# Patient Record
Sex: MALE | Race: BLACK OR AFRICAN AMERICAN | NOT HISPANIC OR LATINO | Employment: FULL TIME | URBAN - METROPOLITAN AREA
[De-identification: names, ages, dates, MRNs, and addresses within clinical notes are randomized per-mention and may not be internally consistent; named-entity substitution may affect disease eponyms.]

---
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
## 2018-01-06 DIAGNOSIS — M79.671 RIGHT FOOT PAIN: Primary | ICD-10-CM

## 2018-01-06 NOTE — PROGRESS NOTES
FREDERIC. Right foot soreness    17 y.o. year-old [employer]   Pt presents with right foot soreness. Currently undergoing rehab. History of revision Foster fracture at OSH on 9/1/17.  Incision c/d/i. Doing well, has generalized foot soreness, denies feeling a pop, denies specific event.    RECENT HISTORY:   1. No current or recent symptoms      PHYSICAL EXAM:      GEN: Alert and oriented x3. In no apparent distress.     Well-developed, well-nourished male. Observation of ears, eyes, and nose   reveal no gross abnormalities.  See ophthal report for full eye exam    CERVICAL SPINE: Full range of motion with no deficits.     BILATERAL SHOULDER EXAM: shows full symmetric range of motion with 5/5   strength throughout the supraspinatus and infraspinatus, deltoid, and   subscapularis. No evidence of instability.     BILATERAL ELBOW EXAM: Shows full and symmetric extension/flexion, and   pronation/supination, and varus/valgus stability. Negative posterolateral   rotatory instability.     BILATERAL WRIST EXAM: Shows normal and symmetric full flexion/extension   and radial/ulnar deviation.     HAND EXAM: Shows full range of motion to bilateral hands and full   strength and stability to all fingers.     LUMBAR SPINE EXAM: Shows no evidence of any scoliosis. He has full   flexion and extension with no pain and no apparent tenderness to the   spine. Negative straight leg raise bilaterally.     HIP EXAM: Shows full range of motion without pain or signs of impingement.   Symmetric internal rotation without pain.  Has 5/5 hip flexion strength and 5/5 strength testing to the entire lower extremity.     KNEE EXAM: Examination of bilateral knees shows symmetric range of motion   0 to 145 degrees with negative Lachman and stable to varus-valgus stress   testing. No joint line tenderness. Good quad tone. No effusion of either knee.     Bilateral legs: NTTP over tibiae mid shaft or distal.      FOOT AND ANKLE EXAM: Shows good  range of motion to bilateral ankles with   no neurologic deficit.High arched feet. Incision healed. Mild TTP along outside of foot and bottom of foot.      IMAGING:   Previous Ct: no evidence of screw fracture, increase in bone density and likely callus around fracture site, fracture line is less apparent.    A/P: 18 yo  with right foot soreness, increasing discomfort through rehabilitation, history of revision garsia fracture ORIF.  - CT right foot to look for interval garsia fracture healing  - MRI right foot for soft tissue irritation, muscle or bone edema  - continue rehab protocol

## 2018-01-08 ENCOUNTER — TELEPHONE (OUTPATIENT)
Dept: SPORTS MEDICINE | Facility: CLINIC | Age: 18
End: 2018-01-08

## 2018-01-08 ENCOUNTER — HOSPITAL ENCOUNTER (OUTPATIENT)
Dept: RADIOLOGY | Facility: HOSPITAL | Age: 18
Discharge: HOME | End: 2018-01-08
Attending: ORTHOPAEDIC SURGERY
Payer: COMMERCIAL

## 2018-01-08 DIAGNOSIS — M79.671 RIGHT FOOT PAIN: Primary | ICD-10-CM

## 2018-01-08 DIAGNOSIS — M79.671 RIGHT FOOT PAIN: ICD-10-CM

## 2018-01-08 PROCEDURE — 73718 MRI LOWER EXTREMITY W/O DYE: CPT | Mod: 26,RT,, | Performed by: RADIOLOGY

## 2018-01-08 PROCEDURE — 73718 MRI LOWER EXTREMITY W/O DYE: CPT | Mod: TC,RT

## 2018-01-12 ENCOUNTER — TELEPHONE (OUTPATIENT)
Dept: SPORTS MEDICINE | Facility: CLINIC | Age: 18
End: 2018-01-12

## 2018-01-12 ENCOUNTER — HOSPITAL ENCOUNTER (OUTPATIENT)
Dept: RADIOLOGY | Facility: HOSPITAL | Age: 18
Discharge: HOME | End: 2018-01-12
Attending: ORTHOPAEDIC SURGERY
Payer: COMMERCIAL

## 2018-01-12 DIAGNOSIS — M79.671 RIGHT FOOT PAIN: ICD-10-CM

## 2018-01-12 DIAGNOSIS — M79.671 RIGHT FOOT PAIN: Primary | ICD-10-CM

## 2018-01-12 PROCEDURE — 76380 CAT SCAN FOLLOW-UP STUDY: CPT | Mod: TC

## 2018-01-12 PROCEDURE — 76380 CAT SCAN FOLLOW-UP STUDY: CPT | Mod: 26,,, | Performed by: RADIOLOGY

## 2018-01-19 ENCOUNTER — TELEPHONE (OUTPATIENT)
Dept: SPORTS MEDICINE | Facility: CLINIC | Age: 18
End: 2018-01-19

## 2018-01-19 RX ORDER — OSELTAMIVIR PHOSPHATE 75 MG/1
75 CAPSULE ORAL 2 TIMES DAILY
Qty: 10 CAPSULE | Refills: 0 | Status: SHIPPED | OUTPATIENT
Start: 2018-01-19 | End: 2018-01-24

## 2018-01-19 NOTE — TELEPHONE ENCOUNTER
Contacted by (and modality): head athletic trainer (DB) via telephone  Symptoms are: flu-like  I contacted the patient via: care coordinator (Alexis)  Symptoms are concerning for influenza  Assessment: suspicion of influenza  With: n/a  Plan:   Patient will go to urgent care, and get swabbed for influenza  In the meantime, with high community incidence of influenza, I will start him on oseltamivir (Tamiflu) 75mg po bid x 5 d  Discussed contacting me (or the medical team) immediately with any questions, or if symptoms change    I will f/u w/ pt directly at tomorrows game

## 2018-01-20 ENCOUNTER — OFFICE VISIT (OUTPATIENT)
Dept: INTERNAL MEDICINE | Facility: CLINIC | Age: 18
End: 2018-01-20
Payer: COMMERCIAL

## 2018-01-20 VITALS
SYSTOLIC BLOOD PRESSURE: 126 MMHG | DIASTOLIC BLOOD PRESSURE: 78 MMHG | HEART RATE: 64 BPM | OXYGEN SATURATION: 98 % | TEMPERATURE: 98 F | WEIGHT: 200 LBS | HEIGHT: 76 IN | BODY MASS INDEX: 24.36 KG/M2

## 2018-01-20 DIAGNOSIS — J40 BRONCHITIS: Primary | ICD-10-CM

## 2018-01-20 DIAGNOSIS — R68.89 FLU-LIKE SYMPTOMS: ICD-10-CM

## 2018-01-20 PROBLEM — S99.191D FRACTURE OF BASE OF FIFTH METATARSAL BONE OF RIGHT FOOT AT METAPHYSEAL-DIAPHYSEAL JUNCTION WITH ROUTINE HEALING: Status: ACTIVE | Noted: 2018-01-20

## 2018-01-20 LAB
CTP QC/QA: YES
FLUAV AG NPH QL: NEGATIVE
FLUBV AG NPH QL: NEGATIVE

## 2018-01-20 PROCEDURE — 99999 PR PBB SHADOW E&M-EST. PATIENT-LVL III: CPT | Mod: PBBFAC,,, | Performed by: INTERNAL MEDICINE

## 2018-01-20 PROCEDURE — 87804 INFLUENZA ASSAY W/OPTIC: CPT | Mod: QW,S$GLB,, | Performed by: INTERNAL MEDICINE

## 2018-01-20 PROCEDURE — 99214 OFFICE O/P EST MOD 30 MIN: CPT | Mod: S$GLB,,, | Performed by: INTERNAL MEDICINE

## 2018-01-20 RX ORDER — DOXYCYCLINE 100 MG/1
100 CAPSULE ORAL 2 TIMES DAILY
Qty: 14 CAPSULE | Refills: 0 | Status: SHIPPED | OUTPATIENT
Start: 2018-01-20 | End: 2019-10-03

## 2018-01-20 NOTE — PROGRESS NOTES
Subjective:       Patient ID: David Harmon is a 17 y.o. male.    Chief Complaint: Cough    [facility] appt    A few nights ago developed cough, vomiting, sore throat, myalgias.  [employer], lots of travel and lots of contacts but nobody specifically with flu.      Was started on Tamiflu yesterday x 2 and 1 dose today.    Did get flu shot last fall.    No real chest pain or tightness.  Non smoker.  No asthma.  No further GI or any  sx.          Cough   Pertinent negatives include no chills, ear pain, fever, postnasal drip, rash, shortness of breath or wheezing.     Review of Systems   Constitutional: Negative for chills, fatigue and fever.   HENT: Positive for congestion. Negative for ear pain, postnasal drip, sinus pain and sinus pressure.    Eyes: Negative.    Respiratory: Positive for cough. Negative for chest tightness, shortness of breath and wheezing.    Cardiovascular: Negative.    Gastrointestinal: Negative.         Vomited x 1, has resolved  No nausea or abdominal pain   Genitourinary: Negative for dysuria and frequency.   Musculoskeletal: Negative for arthralgias.   Skin: Negative for rash.       Objective:      Physical Exam   Constitutional: He is oriented to person, place, and time. He appears well-developed and well-nourished.   HENT:   Head: Normocephalic and atraumatic.   Right Ear: External ear normal.   Left Ear: External ear normal.   Mouth/Throat: Oropharynx is clear and moist.   TMs dull, no effusion   Neck: Normal range of motion. Neck supple. No thyromegaly present.   Cardiovascular: Normal rate and regular rhythm.    Pulmonary/Chest: No respiratory distress. He has no wheezes. He has no rales.   Abdominal: Soft. Bowel sounds are normal. He exhibits no distension. There is no tenderness.   Musculoskeletal: He exhibits no edema.   Neurological: He is alert and oriented to person, place, and time.   Skin: Skin is warm and dry. No rash noted. No erythema.   Psychiatric: He has a normal mood and  affect.       Assessment:       1. Bronchitis    2. Flu-like symptoms        Plan:         Bronchitis      Flu-like sx    Other orders  -     doxycycline (MONODOX) 100 MG capsule; Take 1 capsule (100 mg total) by mouth 2 (two) times daily.  Dispense: 14 capsule; Refill: 0.  Cautions and side effects reviewed    Rest, fluids, acetaminophen, mucinex and follow up poor results.  OK to continue with Tamiflu empirically, even though flu culture negative

## 2018-01-20 NOTE — PATIENT INSTRUCTIONS
What Is Acute Bronchitis?  Acute bronchitis is when the airways in your lungs (bronchial tubes) become red and swollen (inflamed). It is usually caused by a viral infection. But it can also occur because of a bacteria or allergen. Symptoms include a cough that produces yellow or greenish mucus and can last for days or sometimes weeks.  Inside healthy lungs    Air travels in and out of the lungs through the airways. The linings of these airways produce sticky mucus. This mucus traps particles that enter the lungs. Tiny structures called cilia then sweep the particles out of the airways.     Healthy airway: Airways are normally open. Air moves in and out easily.      Healthy cilia: Tiny, hairlike cilia sweep mucus and particles up and out of the airways.   Lungs with bronchitis  Bronchitis often occurs with a cold or the flu virus. The airways become inflamed (red and swollen). There is a deep hacking cough from the extra mucus. Other symptoms may include:  · Wheezing  · Chest discomfort  · Shortness of breath  · Mild fever  A second infection, this time due to bacteria, may then occur. And airways irritated by allergens or smoke are more likely to get infected.        Inflamed airway: Inflammation and extra mucus narrow the airway, causing shortness of breath.      Impaired cilia: Extra mucus impairs cilia, causing congestion and wheezing. Smoking makes the problem worse.   Making a diagnosis  A physical exam, health history, and certain tests help your healthcare provider make the diagnosis.  Health history  Your healthcare provider will ask you about your symptoms.  The exam  Your provider listens to your chest for signs of congestion. He or she may also check your ears, nose, and throat.  Possible tests  · A sputum test for bacteria. This requires a sample of mucus from your lungs.  · A nasal or throat swab. This tests to see if you have a bacterial infection.  · A chest X-ray. This is done if your healthcare  provider thinks you have pneumonia.  · Tests to check for an underlying condition. Other tests may be done to check for things such as allergies, asthma, or COPD (chronic obstructive pulmonary disease). You may need to see a specialist for more lung function testing.  Treating a cough  The main treatment for bronchitis is easing symptoms. Avoiding smoke, allergens, and other things that trigger coughing can often help. If the infection is bacterial, you may be given antibiotics. During the illness, it's important to get plenty of sleep. To ease symptoms:  · Dont smoke. Also avoid secondhand smoke.  · Use a humidifier. Or try breathing in steam from a hot shower. This may help loosen mucus.  · Drink a lot of water and juice. They can soothe the throat and may help thin mucus.  · Sit up or use extra pillows when in bed. This helps to lessen coughing and congestion.  · Ask your provider about using medicine. Ask about using cough medicine, pain and fever medicine, or a decongestant.  Antibiotics  Most cases of bronchitis are caused by cold or flu viruses. They dont need antibiotics to treat them, even if your mucus is thick and green or yellow. Antibiotics dont treat viral illness and antibiotics have not been shown to have any benefit in cases of acute bronchitis. Taking antibiotics when they are not needed increases your risk of getting an infection later that is antibiotic-resistant. Antibiotics can also cause severe cases of diarrhea that require other antibiotics to treat.  It is important that you accept your healthcare provider's opinion to not use antibiotics. Your provider will prescribe antibiotics if the infection is caused by bacteria. If they are prescribed:  · Take all of the medicine. Take the medicine until it is used up, even if symptoms have improved. If you dont, the bronchitis may come back.  · Take the medicines as directed. For instance, some medicines should be taken with food.  · Ask about  side effects. Ask your provider or pharmacist what side effects are common, and what to do about them.  Follow-up care  You should see your provider again in 2 to 3 weeks. By this time, symptoms should have improved. An infection that lasts longer may mean you have a more serious problem.  Prevention  · Avoid tobacco smoke. If you smoke, quit. Stay away from smoky places. Ask friends and family not to smoke around you, or in your home or car.  · Get checked for allergies.  · Ask your provider about getting a yearly flu shot. Also ask about pneumococcal or pneumonia shots.  · Wash your hands often. This helps reduce the chance of picking up viruses that cause colds and flu.  Call your healthcare provider if:  · Symptoms worsen, or you have new symptoms  · Breathing problems worsen or  become severe  · Symptoms dont get better within a week, or within 3 days of taking antibiotics   Date Last Reviewed: 2/1/2017  © 4761-3574 [employer]. [address] [place] [address], [place] [address]. All rights reserved. This information is not intended as a substitute for professional medical care. Always follow your healthcare professional's instructions.

## 2018-01-22 ENCOUNTER — TELEPHONE (OUTPATIENT)
Dept: INTERNAL MEDICINE | Facility: CLINIC | Age: 18
End: 2018-01-22

## 2018-01-22 NOTE — TELEPHONE ENCOUNTER
----- Message from Candelaria Maldonado MA sent at 1/22/2018 11:45 AM CST -----  Regarding: FW: Merom Player      ----- Message -----  From: Kamla Ku MA  Sent: 1/19/2018   2:43 PM  To: [facility] Staff  Subject: Merom Player                                   Good afternoon,    I work with Dr. Vargas. He is the team physician for the [place] [facility]. [facility] contacted him to let him know that David was sick and was being seen today by your office. Dr. Vargas is trying to f/u on that visit.     Can you please have Dr. Ingram call Dr. Vargas at [phone]?    Thanks,  Kamla Ku MA  [facility]

## 2018-01-22 NOTE — TELEPHONE ENCOUNTER
I claled, no answer    He was booked on my Friday schedule, arrived and checked in, but was not there multiple times when my nurse tried to locate him to bring him back.    I see in the chart Dr. Rick saw him on 1/20/18 and that note is in the chart  angelina

## 2018-01-23 ENCOUNTER — TELEPHONE (OUTPATIENT)
Dept: SPORTS MEDICINE | Facility: CLINIC | Age: 18
End: 2018-01-23

## 2018-01-23 NOTE — TELEPHONE ENCOUNTER
Note from 18.01.19    Contacted by (and modality): head athletic trainer (DB) via telephone  Symptoms are: flu-like  I contacted the patient via: care coordinator (Alexis)  Symptoms are concerning for influenza  Assessment: suspicion of influenza  With: n/a  Plan:   Patient will go to urgent care, and get swabbed for influenza  In the meantime, with high community incidence of influenza, I will start him on oseltamivir (Tamiflu) 75mg po bid x 5 d  Discussed contacting me (or the medical team) immediately with any questions, or if symptoms change    I will f/u w/ pt directly at tomorrows game

## 2018-01-25 ENCOUNTER — TELEPHONE (OUTPATIENT)
Dept: SPORTS MEDICINE | Facility: CLINIC | Age: 18
End: 2018-01-25

## 2018-02-05 DIAGNOSIS — M79.671 RIGHT FOOT PAIN: ICD-10-CM

## 2018-02-05 DIAGNOSIS — M79.672 LEFT FOOT PAIN: Primary | ICD-10-CM

## 2018-02-06 ENCOUNTER — HOSPITAL ENCOUNTER (OUTPATIENT)
Dept: RADIOLOGY | Facility: HOSPITAL | Age: 18
Discharge: HOME | End: 2018-02-06
Attending: ORTHOPAEDIC SURGERY
Payer: COMMERCIAL

## 2018-02-06 DIAGNOSIS — M79.671 RIGHT FOOT PAIN: ICD-10-CM

## 2018-02-06 PROCEDURE — 73718 MRI LOWER EXTREMITY W/O DYE: CPT | Mod: TC,RT

## 2018-02-06 PROCEDURE — 73700 CT LOWER EXTREMITY W/O DYE: CPT | Mod: 26,RT,, | Performed by: RADIOLOGY

## 2018-02-06 PROCEDURE — 73718 MRI LOWER EXTREMITY W/O DYE: CPT | Mod: 26,RT,, | Performed by: RADIOLOGY

## 2018-02-06 PROCEDURE — 73700 CT LOWER EXTREMITY W/O DYE: CPT | Mod: TC,RT

## 2018-02-06 NOTE — PROGRESS NOTES
ROLAC. Right foot soreness     17 y.o. year-old [place]   Pt presents with continued right foot soreness. Currently undergoing rehab. History of revision Foster fracture at OSH on 9/1/17.  Incision c/d/i. Doing well, has generalized foot soreness, denies feeling a pop, denies specific event.     RECENT HISTORY:   1. No current or recent symptoms        PHYSICAL EXAM:         FOOT AND ANKLE EXAM: Shows good range of motion to bilateral ankles with   no neurologic deficit.High arched feet. Incision healed. Mild TTP along outside of foot and bottom of foot.        IMAGING:   Previous Ct: no evidence of screw fracture, increase in bone density and likely callus around fracture site, fracture line is less apparent.     A/P: 16 yo  with right foot soreness, increasing discomfort through rehabilitation, history of revision foster fracture ORIF.  - CT right foot to look for interval foster fracture healing  - MRI right foot for soft tissue irritation, muscle or bone edema  - continue rehab protocol

## 2018-02-07 ENCOUNTER — HOSPITAL ENCOUNTER (OUTPATIENT)
Dept: RADIOLOGY | Facility: HOSPITAL | Age: 18
Discharge: HOME | End: 2018-02-07
Attending: ORTHOPAEDIC SURGERY
Payer: COMMERCIAL

## 2018-02-07 ENCOUNTER — LAB VISIT (OUTPATIENT)
Dept: LAB | Facility: HOSPITAL | Age: 18
End: 2018-02-07
Attending: ORTHOPAEDIC SURGERY
Payer: COMMERCIAL

## 2018-02-07 ENCOUNTER — OFFICE VISIT (OUTPATIENT)
Dept: SPORTS MEDICINE | Facility: CLINIC | Age: 18
End: 2018-02-07
Payer: COMMERCIAL

## 2018-02-07 ENCOUNTER — TELEPHONE (OUTPATIENT)
Dept: SPORTS MEDICINE | Facility: CLINIC | Age: 18
End: 2018-02-07

## 2018-02-07 DIAGNOSIS — M79.671 RIGHT FOOT PAIN: Primary | ICD-10-CM

## 2018-02-07 DIAGNOSIS — M79.671 RIGHT FOOT PAIN: ICD-10-CM

## 2018-02-07 LAB — 25(OH)D3+25(OH)D2 SERPL-MCNC: 24 NG/ML

## 2018-02-07 PROCEDURE — 73630 X-RAY EXAM OF FOOT: CPT | Mod: 26,RT,, | Performed by: RADIOLOGY

## 2018-02-07 PROCEDURE — 99214 OFFICE O/P EST MOD 30 MIN: CPT | Mod: S$GLB,,, | Performed by: ORTHOPAEDIC SURGERY

## 2018-02-07 PROCEDURE — 36415 COLL VENOUS BLD VENIPUNCTURE: CPT | Mod: PO

## 2018-02-07 PROCEDURE — 99999 PR PBB SHADOW E&M-EST. PATIENT-LVL I: CPT | Mod: PBBFAC,,, | Performed by: ORTHOPAEDIC SURGERY

## 2018-02-07 PROCEDURE — 82306 VITAMIN D 25 HYDROXY: CPT

## 2018-02-07 PROCEDURE — 73630 X-RAY EXAM OF FOOT: CPT | Mod: TC,FY,PO,RT

## 2018-02-07 PROCEDURE — 3008F BODY MASS INDEX DOCD: CPT | Mod: S$GLB,,, | Performed by: ORTHOPAEDIC SURGERY

## 2018-02-07 NOTE — TELEPHONE ENCOUNTER
"Spoke c pharmacist from [facility].    Pharmacist stated she spoke c a "nurse" from out office today.      [facility] attempted to refill Forteo today.     Per pharmacist,  is denying refill as this claim has over an $8,000 balance.     Informed pharmacist I am not the best person to answer these questions and will call back after I know more information regarding this case.    ==    No one from [facility] spoke arie Khalil today. Dr. Harrison suggested I call Duane Brooks, ATC.     ==    Left voicemail for Duane Brooks, ATC    ==    Spoke c Kamla Ku ATC.    She stated she does not understand why  would deny refill. She stated she will contact Duane Brooks, ATC.     ==    Spoke c Dr. Vargas.     Informed him of above information.     He will speak c Duane Brooks, ATC at tonight's [employer] game.     ==    Notified Dr. Harrison of plan.     "

## 2018-02-07 NOTE — PROGRESS NOTES
ROLAC. Right foot soreness     17 y.o. year-old [employer]   Pt presents with continued right foot mild lateral soreness. Currently undergoing rehab. History of revision Foster fracture with Dr. Chan on 9/1/17.  Incision c/d/i. Doing well, has generalized foot soreness, denies feeling a pop, denies specific event.    Review of Systems   Constitution: Negative. Negative for chills, fever and night sweats.   HENT: Negative for congestion and headaches.    Eyes: Negative for blurred vision, left vision loss and right vision loss.   Cardiovascular: Negative for chest pain and syncope.   Respiratory: Negative for cough and shortness of breath.    Endocrine: Negative for polydipsia, polyphagia and polyuria.   Hematologic/Lymphatic: Negative for bleeding problem. Does not bruise/bleed easily.   Skin: Negative for dry skin, itching and rash.   Musculoskeletal: Negative for falls and muscle weakness.   Gastrointestinal: Negative for abdominal pain and bowel incontinence.   Genitourinary: Negative for bladder incontinence and nocturia.   Neurological: Negative for disturbances in coordination, loss of balance and seizures.   Psychiatric/Behavioral: Negative for depression. The patient does not have insomnia.    Allergic/Immunologic: Negative for hives and persistent infections.       PAST MEDICAL HISTORY:   Past Medical History:   Diagnosis Date    Fracture of base of fifth metatarsal bone of right foot at metaphyseal-diaphyseal junction with routine healing 1/20/2018     PAST SURGICAL HISTORY: No past surgical history on file.  FAMILY HISTORY: No family history on file.  SOCIAL HISTORY:   Social History     Social History    Marital status: Unknown     Spouse name: N/A    Number of children: N/A    Years of education: N/A     Occupational History    Not on file.     Social History Main Topics    Smoking status: Never Smoker    Smokeless tobacco: Never Used    Alcohol use Not on file    Drug use:  Unknown    Sexual activity: Not on file     Other Topics Concern    Not on file     Social History Narrative    No narrative on file       MEDICATIONS:   Current Outpatient Prescriptions:     doxycycline (MONODOX) 100 MG capsule, Take 1 capsule (100 mg total) by mouth 2 (two) times daily., Disp: 14 capsule, Rfl: 0    hydrocodone-acetaminophen 7.5-325mg (NORCO) 7.5-325 mg per tablet, Take 1-2 tabs every 4-6 hours prn pain, Disp: 40 tablet, Rfl: 0  ALLERGIES: Review of patient's allergies indicates:  No Known Allergies    VITAL SIGNS: There were no vitals taken for this visit.         PHYSICAL EXAM:            PHYSICAL EXAMINATION    General:  The patient is alert and oriented x 3.  Mood is pleasant.  Observation of ears, eyes and nose reveal no gross abnormalities.  No labored breathing observed.    right Foot and Ankle Exam    INSPECTION:      ALIGNMENT:  Gait:    Normal   Hindfoot  Normal    Scars:   None    Midfoot: Normal  Swelling:   mild and moderate    Forefoot: Normal  Color:   Normal      Atrophy:  None    Collective Ankle-Hindfoot Alignment    Heel / Toe Walking: No difficulty   Good -plantigrade (PG), well aligned           [Fair-PG, malaligned, asymptomatic]         [Poor-Non-PG,malaligned, has sxs]     TENDERNESS:  lATERAL:    anterior:  Sinus tarsi:  None  Anteromedial joint line:  none  Syndesmosis:  none  Anterolateral joint line:   none  ATFL:   none  Talonavicular:    none   CFL:   none  Anterior tibialis:   none  Anterolateral gutter: none  Extensor tendons:   none  Fibula:   none  Peroneal tendons: none  POSTERIOR:  Peroneal tubercle.  None  Medial/lateral achilles:   none       Medial/lateral achilles insertion: none  MEDIAL:      Deltoid:  none  CALCANEUS:  Malleolus:  none  Retrocalcaneal:   none  PTT:   none  Medial achilles:   none  Navicular:  none  Lateral achilles:   none       Calcaneal tuberosity:   none  FOOT:    Calcaneal cuboid  none MT / MT heads:  none   Navicular   none   Medial cord origin PF:  none  Cuneiforms:   none  Web space:   none  Lisfranc    none  Tarsal tunnel:   none  Base of the fifth metatarsal  none Tinels sign   neg        RANGE OF MOTION:  RIGHT/ LEFT   STRENGTH: (affected)  Ankle DF/PF:  15/45  15/45    Anterior tibialis: 5/5     Eversion/Inversion: 15/25 15/25  Posterior tibialis: 5/5   Midfoot ABD/ADD: 10/10 10/10  Gastroc-soleus: 5/5   First MTP DF/PF: 60/25 60/25  Peroneals:  5/5         EHL:   5/5   (* = pain)     FHL:   5/5         (* = pain)      SPECIAL TESTS:   ANKLE INSTABILITY: (*pain)    Anterior drawer:   Grade 1      (C-W contralateral side)     Inversion:   30°     Eversion  10°            Collective Instability: (Ant-post and varus-valgus)     Stable        PROVOCATIVE TESTING:    Forced DF/ER: No pain at syndesmosis.    Mid-leg squeeze  No pain at syndesmosis    Forced DF:  No pain anterior joint line.      Forced PF:  No pain posterior ankle.     Forced INV:  No pain lateral    Forced EV:  No pain medial     Muñozs sign: Normal ankle plantar flexion.     Resisted peroneal No subluxation or pain    1st-2nd MT toggle No pain at Lisfranc    MT-T torque  No pain at Lisfranc     NEUROLOGIC TESTING:  All dermatomes foot, ankle and leg have normal sensation light touch  Ankle Reflexes 2+, symmetric   Negative Babinski and No Clonus    VASCULAR:  2+ pulses PT/DT with brisk capillary refill toes.    Other Findings:  Right  mild swelling is present            Mild TTP along outside of foot and bottom of foot.        IMAGING:   XRays: The patient is status post ORIF for fracture in the proximal diaphysis of the LEFT fifth metatarsal.  Hardware is in satisfactory position and alignment.  Fracture line observed on 9/18/2017 has resolved.  Callus has matured at the site of prior injury.      CT:  Interval healing seen at base of 5th metatarsal, no evidence of fracture    MRI: Status post 5th metatarsal screw revision.  Despite metal suppression  techniques, the bone and soft tissues surrounding the screw a markedly distorted by artifact.  No definite evidence for complication.  Subchondral edema at the anterior cuboid mildly diminished from prior study.     A/P: 18 yo  with right foot soreness, increasing discomfort through rehabilitation, history of revision garsia fracture ORIF.  - continue rehab protocol  - discuss with Dr Chan Friday  -CT and MRI reviewed with Dr Chan  - Vitamin D labs

## 2018-02-09 ENCOUNTER — TELEPHONE (OUTPATIENT)
Dept: SPORTS MEDICINE | Facility: CLINIC | Age: 18
End: 2018-02-09

## 2018-02-09 NOTE — TELEPHONE ENCOUNTER
Contacted by (and modality): head team physician (MS) via text    Pt w/ right 5th MT fracture (and subsequent ORIF)  Found on 02/07/18 to have [blood vitamin D] = 24 ng/mL    Assessment: low serum vitamin D  With: n/a    Plan:   1) Supplement with:  Vitamin D3 (cholecalciferol)   1000 international units (IU), by mouth, daily    2) Repeat [blood vitamin D] in 8 weeks  (around 04/10/08)     Plan was discussed with the patient and with the medical team (MS and DB) via text

## 2018-02-12 RX ORDER — TRAMADOL HYDROCHLORIDE 50 MG/1
50 TABLET ORAL EVERY 12 HOURS PRN
Qty: 30 TABLET | Refills: 0 | Status: SHIPPED | OUTPATIENT
Start: 2018-02-12 | End: 2018-02-22

## 2018-02-23 ENCOUNTER — TELEPHONE (OUTPATIENT)
Dept: SPORTS MEDICINE | Facility: CLINIC | Age: 18
End: 2018-02-23

## 2018-02-23 DIAGNOSIS — M79.671 RIGHT FOOT PAIN: Primary | ICD-10-CM

## 2018-02-23 DIAGNOSIS — M89.9 DISORDER OF BONE: ICD-10-CM

## 2018-02-26 ENCOUNTER — HOSPITAL ENCOUNTER (OUTPATIENT)
Dept: RADIOLOGY | Facility: HOSPITAL | Age: 18
Discharge: HOME | End: 2018-02-26
Attending: PHYSICIAN ASSISTANT
Payer: COMMERCIAL

## 2018-02-26 DIAGNOSIS — M79.671 RIGHT FOOT PAIN: ICD-10-CM

## 2018-02-26 DIAGNOSIS — M89.9 DISORDER OF BONE: ICD-10-CM

## 2018-02-26 DIAGNOSIS — M89.9 BONE DISEASE: ICD-10-CM

## 2018-02-26 DIAGNOSIS — M89.9 BONE DISEASE: Primary | ICD-10-CM

## 2018-02-26 PROCEDURE — A9503 TC99M MEDRONATE: HCPCS

## 2018-02-26 PROCEDURE — 78315 BONE IMAGING 3 PHASE: CPT | Mod: 26,,, | Performed by: RADIOLOGY

## 2018-02-26 PROCEDURE — 78320 NM BONE SCAN SPECT: CPT | Mod: TC

## 2018-02-26 PROCEDURE — 78315 BONE IMAGING 3 PHASE: CPT | Mod: TC

## 2018-02-26 PROCEDURE — 78320 NM BONE SCAN SPECT: CPT | Mod: 26,,, | Performed by: RADIOLOGY

## 2018-03-16 RX ORDER — OXYCODONE AND ACETAMINOPHEN 10; 325 MG/1; MG/1
1 TABLET ORAL EVERY 6 HOURS PRN
Qty: 30 TABLET | Refills: 0 | Status: SHIPPED | OUTPATIENT
Start: 2018-03-16 | End: 2019-10-03

## 2018-06-18 ENCOUNTER — TELEPHONE (OUTPATIENT)
Dept: SPORTS MEDICINE | Facility: CLINIC | Age: 18
End: 2018-06-18

## 2018-06-18 DIAGNOSIS — M79.671 RIGHT FOOT PAIN: Primary | ICD-10-CM

## 2018-06-20 ENCOUNTER — HOSPITAL ENCOUNTER (OUTPATIENT)
Dept: RADIOLOGY | Facility: HOSPITAL | Age: 18
Discharge: HOME | End: 2018-06-20
Attending: STUDENT IN AN ORGANIZED HEALTH CARE EDUCATION/TRAINING PROGRAM
Payer: COMMERCIAL

## 2018-06-20 DIAGNOSIS — M79.671 RIGHT FOOT PAIN: ICD-10-CM

## 2018-06-20 PROCEDURE — 73718 MRI LOWER EXTREMITY W/O DYE: CPT | Mod: 26,RT,, | Performed by: RADIOLOGY

## 2018-06-20 PROCEDURE — 73718 MRI LOWER EXTREMITY W/O DYE: CPT | Mod: TC,RT

## 2018-06-20 PROCEDURE — 73700 CT LOWER EXTREMITY W/O DYE: CPT | Mod: TC,RT

## 2018-06-20 PROCEDURE — 73700 CT LOWER EXTREMITY W/O DYE: CPT | Mod: 26,RT,, | Performed by: RADIOLOGY

## 2018-07-02 ENCOUNTER — OFFICE VISIT (OUTPATIENT)
Dept: SPORTS MEDICINE | Facility: CLINIC | Age: 18
End: 2018-07-02

## 2018-07-02 DIAGNOSIS — Z00.00 WELLNESS EXAMINATION: Primary | ICD-10-CM

## 2018-07-02 PROCEDURE — 99214 OFFICE O/P EST MOD 30 MIN: CPT | Mod: ,,, | Performed by: ORTHOPAEDIC SURGERY

## 2018-07-02 PROCEDURE — 99999 PR PBB SHADOW E&M-EST. PATIENT-LVL I: CPT | Mod: PBBFAC,,, | Performed by: ORTHOPAEDIC SURGERY

## 2018-07-02 NOTE — PROGRESS NOTES
C.C. Right foot pain     17 y.o. year-old [employer]  s/p revision Foster fracture with Dr. Chan on 9/1/17.  then revision of scar and BMAC injection to cuboid with Dr. Chan 3/13/18    Doing well, no issues.  Full go.          Review of Systems   Constitution: Negative. Negative for chills, fever and night sweats.   HENT: Negative for congestion and headaches.    Eyes: Negative for blurred vision, left vision loss and right vision loss.   Cardiovascular: Negative for chest pain and syncope.   Respiratory: Negative for cough and shortness of breath.    Endocrine: Negative for polydipsia, polyphagia and polyuria.   Hematologic/Lymphatic: Negative for bleeding problem. Does not bruise/bleed easily.   Skin: Negative for dry skin, itching and rash.   Musculoskeletal: Negative for falls and muscle weakness.   Gastrointestinal: Negative for abdominal pain and bowel incontinence.   Genitourinary: Negative for bladder incontinence and nocturia.   Neurological: Negative for disturbances in coordination, loss of balance and seizures.   Psychiatric/Behavioral: Negative for depression. The patient does not have insomnia.    Allergic/Immunologic: Negative for hives and persistent infections.       PAST MEDICAL HISTORY:   Past Medical History:   Diagnosis Date    Fracture of base of fifth metatarsal bone of right foot at metaphyseal-diaphyseal junction with routine healing 1/20/2018     PAST SURGICAL HISTORY: History reviewed. No pertinent surgical history.  FAMILY HISTORY: History reviewed. No pertinent family history.  SOCIAL HISTORY:   Social History     Social History    Marital status: Single     Spouse name: N/A    Number of children: N/A    Years of education: N/A     Occupational History    Not on file.     Social History Main Topics    Smoking status: Never Smoker    Smokeless tobacco: Never Used    Alcohol use Not on file    Drug use: Unknown    Sexual activity: Not on file     Other  Topics Concern    Not on file     Social History Narrative    No narrative on file       MEDICATIONS:   Current Outpatient Prescriptions:     doxycycline (MONODOX) 100 MG capsule, Take 1 capsule (100 mg total) by mouth 2 (two) times daily., Disp: 14 capsule, Rfl: 0    hydrocodone-acetaminophen 7.5-325mg (NORCO) 7.5-325 mg per tablet, Take 1-2 tabs every 4-6 hours prn pain, Disp: 40 tablet, Rfl: 0    oxyCODONE-acetaminophen (PERCOCET)  mg per tablet, Take 1 tablet by mouth every 6 (six) hours as needed for Pain., Disp: 30 tablet, Rfl: 0  ALLERGIES: Review of patient's allergies indicates:  No Known Allergies    VITAL SIGNS: There were no vitals taken for this visit.         PHYSICAL EXAM:            PHYSICAL EXAMINATION    General:  The patient is alert and oriented x 3.  Mood is pleasant.  Observation of ears, eyes and nose reveal no gross abnormalities.  No labored breathing observed.    right Foot and Ankle Exam    INSPECTION:      ALIGNMENT:  Gait:    Normal   Hindfoot  Normal    Scars:   None    Midfoot: Normal  Swelling:   mild and moderate    Forefoot: Normal  Color:   Normal      Atrophy:  None    Collective Ankle-Hindfoot Alignment    Heel / Toe Walking: No difficulty   Good -plantigrade (PG), well aligned           [Fair-PG, malaligned, asymptomatic]         [Poor-Non-PG,malaligned, has sxs]     TENDERNESS:  lATERAL:    anterior:  Sinus tarsi:  None  Anteromedial joint line:  none  Syndesmosis:  none  Anterolateral joint line:   none  ATFL:   none  Talonavicular:    none   CFL:   none  Anterior tibialis:   none  Anterolateral gutter: none  Extensor tendons:   none  Fibula:   none  Peroneal tendons: none  POSTERIOR:  Peroneal tubercle.  None  Medial/lateral achilles:   none       Medial/lateral achilles insertion: none  MEDIAL:      Deltoid:  none  CALCANEUS:  Malleolus:  none  Retrocalcaneal:   none  PTT:   none  Medial achilles:   none  Navicular:  none  Lateral achilles:   none       Calcaneal  tuberosity:   none  FOOT:    Calcaneal cuboid  none MT / MT heads:  none   Navicular   none  Medial cord origin PF:  none  Cuneiforms:   none  Web space:   none  Lisfranc    none  Tarsal tunnel:   none  Base of the fifth metatarsal  none Tinels sign   neg        RANGE OF MOTION:  RIGHT/ LEFT   STRENGTH: (affected)  Ankle DF/PF:  15/45  15/45    Anterior tibialis: 5/5     Eversion/Inversion: 15/25 15/25  Posterior tibialis: 5/5   Midfoot ABD/ADD: 10/10 10/10  Gastroc-soleus: 5/5   First MTP DF/PF: 60/25 60/25  Peroneals:  5/5         EHL:   5/5   (* = pain)     FHL:   5/5         (* = pain)      SPECIAL TESTS:   ANKLE INSTABILITY: (*pain)    Anterior drawer:   Grade 1      (C-W contralateral side)     Inversion:   30°     Eversion  10°            Collective Instability: (Ant-post and varus-valgus)     Stable        PROVOCATIVE TESTING:    Forced DF/ER: No pain at syndesmosis.    Mid-leg squeeze  No pain at syndesmosis    Forced DF:  No pain anterior joint line.      Forced PF:  No pain posterior ankle.     Forced INV:  No pain lateral    Forced EV:  No pain medial     Muñozs sign: Normal ankle plantar flexion.     Resisted peroneal No subluxation or pain    1st-2nd MT toggle No pain at Lisfranc    MT-T torque  No pain at Lisfranc     NEUROLOGIC TESTING:  All dermatomes foot, ankle and leg have normal sensation light touch  Ankle Reflexes 2+, symmetric   Negative Babinski and No Clonus    VASCULAR:  2+ pulses PT/DT with brisk capillary refill toes.    Other Findings:           No TTP along outside of foot and bottom of foot.        IMAGING:   XRays: The patient is status post ORIF for fracture in the proximal diaphysis of the LEFT fifth metatarsal.  Hardware is in satisfactory position and alignment.  Fracture line observed on 9/18/2017 has resolved.  Callus has matured at the site of prior injury.      CT:  Interval healing seen at base of 5th metatarsal, no evidence of fracture    MRI: Status post 5th  metatarsal screw revision.  Despite metal suppression techniques, the bone and soft tissues surrounding the screw a markedly distorted by artifact.  No definite evidence for complication.  Subchondral edema at the anterior cuboid mildly diminished from prior study.     A/P: 16 yo  history of revision garsia fracture ORIF. And cuboid injection and scar ZENON  - full go   - discuss with Dr Chan Friday  - CT and MRI reviewed with Dr Chan  - Vitamin D labs

## 2018-07-03 ENCOUNTER — OFFICE VISIT (OUTPATIENT)
Dept: INTERNAL MEDICINE | Facility: CLINIC | Age: 18
End: 2018-07-03
Attending: FAMILY MEDICINE

## 2018-07-03 VITALS
HEART RATE: 51 BPM | HEIGHT: 76 IN | DIASTOLIC BLOOD PRESSURE: 63 MMHG | SYSTOLIC BLOOD PRESSURE: 110 MMHG | BODY MASS INDEX: 24.36 KG/M2 | WEIGHT: 200 LBS

## 2018-07-03 DIAGNOSIS — Z00.00 ANNUAL PHYSICAL EXAM: Primary | ICD-10-CM

## 2018-07-03 DIAGNOSIS — S99.191D CLOSED FRACTURE OF BASE OF FIFTH METATARSAL BONE OF RIGHT FOOT AT METAPHYSEAL-DIAPHYSEAL JUNCTION WITH ROUTINE HEALING, SUBSEQUENT ENCOUNTER: ICD-10-CM

## 2018-07-03 PROCEDURE — 99999 PR PBB SHADOW E&M-EST. PATIENT-LVL III: CPT | Mod: PBBFAC,,, | Performed by: FAMILY MEDICINE

## 2018-07-03 PROCEDURE — 99395 PREV VISIT EST AGE 18-39: CPT | Mod: ,,, | Performed by: FAMILY MEDICINE

## 2018-07-03 NOTE — PROGRESS NOTES
Subjective:       Patient ID: David Harmon is a 18 y.o. male.    Chief Complaint: No chief complaint on file.    New patient for an annual wellness check/physical exam. No specific complaints, please see ROS. Preseason [employer] clearance physical. Player Histories and Physical also documented on separate forms.        Review of Systems   Constitutional: Negative for chills, diaphoresis, fatigue and unexpected weight change.   HENT: Negative for congestion, nosebleeds and tinnitus.    Eyes: Negative for redness and visual disturbance.   Respiratory: Negative for cough, chest tightness and shortness of breath.    Cardiovascular: Negative for chest pain, palpitations and leg swelling.   Gastrointestinal: Negative for abdominal pain and blood in stool.   Genitourinary: Negative for decreased urine volume and difficulty urinating.   Musculoskeletal: Negative for joint swelling, myalgias, neck pain and neck stiffness.   Skin: Negative for color change and rash.   Neurological: Negative for dizziness, tremors, syncope, speech difficulty and headaches.   Psychiatric/Behavioral: Negative for agitation and dysphoric mood. The patient is not nervous/anxious.        Objective:      Physical Exam   Constitutional: He is oriented to person, place, and time. He appears well-developed and well-nourished.   HENT:   Head: Normocephalic and atraumatic.   Eyes: Conjunctivae are normal. No scleral icterus.   Neck: Normal range of motion. Neck supple.   Cardiovascular: Normal rate, regular rhythm, normal heart sounds and intact distal pulses.  Exam reveals no gallop and no friction rub.    No murmur heard.  Pulmonary/Chest: Effort normal and breath sounds normal. No respiratory distress. He has no wheezes. He has no rales.   Abdominal: He exhibits no distension. There is no tenderness.   Musculoskeletal: He exhibits no edema or deformity.   Neurological: He is alert and oriented to person, place, and time. He displays no tremor. No cranial  nerve deficit. Coordination and gait normal.   Skin: Skin is warm and dry. No rash noted. He is not diaphoretic. No erythema.   Psychiatric: He has a normal mood and affect. His behavior is normal. Judgment and thought content normal.   Nursing note and vitals reviewed.      Assessment:       1. Annual physical exam    2. Closed fracture of base of fifth metatarsal bone of right foot at metaphyseal-diaphyseal junction with routine healing, subsequent encounter        Plan:   Diagnoses and all orders for this visit:    Annual physical exam    Closed fracture of base of fifth metatarsal bone of right foot at metaphyseal-diaphyseal junction with routine healing, subsequent encounter      See meds, orders, follow up, routing and instructions sections of encounter.    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  I reviewed his [employer] [facility] medical history questionnaire, had a   prior evaluation at the [employer] last year, discussed separately.    States had a concussion as a high school senior missed a couple of weeks of   play.  Remainder of his personal, cardiac, family history was reviewed and   essentially negative.    The patient had cardiovascular evaluation at the May 2017 [employer] Combine.    His stress echocardiogram did not show any adverse information and I will   include this for scanning into his [facility] record, date of service was   05/13/2017, which would count towards this summer's summer league clearance   criteria for testing since it occurred after 05/01/2017.    DISPOSITION:  Clear from a cardiovascular standpoint at this time.      RYNE  dd: 07/03/2018 10:38:29 (CDT)  td: 07/04/2018 07:13:28 (CDT)  Doc ID   #[id number]  Job ID #[id number]    CC:       [id number]

## 2018-08-31 ENCOUNTER — TELEPHONE (OUTPATIENT)
Dept: SPORTS MEDICINE | Facility: CLINIC | Age: 18
End: 2018-08-31

## 2018-08-31 DIAGNOSIS — Z02.5 ROUTINE SPORTS PHYSICAL EXAM: Primary | ICD-10-CM

## 2018-09-11 ENCOUNTER — HOSPITAL ENCOUNTER (OUTPATIENT)
Dept: CARDIOLOGY | Facility: CLINIC | Age: 18
Discharge: HOME | End: 2018-09-11
Attending: FAMILY MEDICINE

## 2018-09-11 DIAGNOSIS — Z02.5 ROUTINE SPORTS PHYSICAL EXAM: ICD-10-CM

## 2018-09-11 DIAGNOSIS — I34.9 NONRHEUMATIC MITRAL VALVE DISORDER: ICD-10-CM

## 2018-09-11 LAB
DIASTOLIC DYSFUNCTION: NO
ESTIMATED PA SYSTOLIC PRESSURE: 29.53
MITRAL VALVE REGURGITATION: NORMAL
RETIRED EF AND QEF - SEE NOTES: 65 (ref 55–65)
TRICUSPID VALVE REGURGITATION: NORMAL

## 2018-09-11 PROCEDURE — 93320 DOPPLER ECHO COMPLETE: CPT | Mod: S$GLB,,, | Performed by: INTERNAL MEDICINE

## 2018-09-11 PROCEDURE — 93351 STRESS TTE COMPLETE: CPT | Mod: S$GLB,,, | Performed by: INTERNAL MEDICINE

## 2018-09-11 PROCEDURE — 93325 DOPPLER ECHO COLOR FLOW MAPG: CPT | Mod: S$GLB,,, | Performed by: INTERNAL MEDICINE

## 2018-09-25 ENCOUNTER — OFFICE VISIT (OUTPATIENT)
Dept: SPORTS MEDICINE | Facility: CLINIC | Age: 18
End: 2018-09-25

## 2018-09-25 ENCOUNTER — CLINICAL SUPPORT (OUTPATIENT)
Dept: INTERNAL MEDICINE | Facility: CLINIC | Age: 18
End: 2018-09-25

## 2018-09-25 VITALS
HEART RATE: 59 BPM | DIASTOLIC BLOOD PRESSURE: 68 MMHG | BODY MASS INDEX: 24.36 KG/M2 | WEIGHT: 200 LBS | HEIGHT: 76 IN | SYSTOLIC BLOOD PRESSURE: 124 MMHG

## 2018-09-25 DIAGNOSIS — Z00.00 WELLNESS EXAMINATION: Primary | ICD-10-CM

## 2018-09-25 DIAGNOSIS — Z00.00 ROUTINE MEDICAL EXAM: ICD-10-CM

## 2018-09-25 DIAGNOSIS — Z00.00 ROUTINE GENERAL MEDICAL EXAMINATION AT A HEALTH CARE FACILITY: Primary | ICD-10-CM

## 2018-09-25 DIAGNOSIS — Z02.5 ROUTINE SPORTS EXAMINATION: Primary | ICD-10-CM

## 2018-09-25 LAB
25(OH)D3+25(OH)D2 SERPL-MCNC: 35 NG/ML
ALBUMIN SERPL BCP-MCNC: 4.4 G/DL
ALP SERPL-CCNC: 141 U/L
ALT SERPL W/O P-5'-P-CCNC: 24 U/L
ANION GAP SERPL CALC-SCNC: 7 MMOL/L
AST SERPL-CCNC: 31 U/L
BASOPHILS # BLD AUTO: 0.04 K/UL
BASOPHILS NFR BLD: 0.6 %
BILIRUB SERPL-MCNC: 0.9 MG/DL
BILIRUB UR QL STRIP: NEGATIVE
BUN SERPL-MCNC: 13 MG/DL
CALCIUM SERPL-MCNC: 9.9 MG/DL
CHLORIDE SERPL-SCNC: 106 MMOL/L
CHOLEST SERPL-MCNC: 120 MG/DL
CHOLEST/HDLC SERPL: 2.2 {RATIO}
CLARITY UR REFRACT.AUTO: CLEAR
CO2 SERPL-SCNC: 27 MMOL/L
COLOR UR AUTO: YELLOW
CREAT SERPL-MCNC: 1 MG/DL
DIFFERENTIAL METHOD: NORMAL
EOSINOPHIL # BLD AUTO: 0.1 K/UL
EOSINOPHIL NFR BLD: 2 %
ERYTHROCYTE [DISTWIDTH] IN BLOOD BY AUTOMATED COUNT: 13.3 %
EST. GFR  (AFRICAN AMERICAN): >60 ML/MIN/1.73 M^2
EST. GFR  (NON AFRICAN AMERICAN): >60 ML/MIN/1.73 M^2
ESTIMATED AVG GLUCOSE: 105 MG/DL
GLUCOSE SERPL-MCNC: 82 MG/DL
GLUCOSE UR QL STRIP: NEGATIVE
HBA1C MFR BLD HPLC: 5.3 %
HCT VFR BLD AUTO: 45.6 %
HDLC SERPL-MCNC: 55 MG/DL
HDLC SERPL: 45.8 %
HGB BLD-MCNC: 14.6 G/DL
HGB UR QL STRIP: NEGATIVE
IMM GRANULOCYTES # BLD AUTO: 0.01 K/UL
IMM GRANULOCYTES NFR BLD AUTO: 0.2 %
KETONES UR QL STRIP: NEGATIVE
LDLC SERPL CALC-MCNC: 57.6 MG/DL
LEUKOCYTE ESTERASE UR QL STRIP: NEGATIVE
LYMPHOCYTES # BLD AUTO: 2.8 K/UL
LYMPHOCYTES NFR BLD: 42.7 %
MCH RBC QN AUTO: 27.7 PG
MCHC RBC AUTO-ENTMCNC: 32 G/DL
MCV RBC AUTO: 86 FL
MONOCYTES # BLD AUTO: 0.6 K/UL
MONOCYTES NFR BLD: 8.8 %
NEUTROPHILS # BLD AUTO: 3 K/UL
NEUTROPHILS NFR BLD: 45.7 %
NITRITE UR QL STRIP: NEGATIVE
NONHDLC SERPL-MCNC: 65 MG/DL
NRBC BLD-RTO: 0 /100 WBC
PH UR STRIP: 5 [PH] (ref 5–8)
PLATELET # BLD AUTO: 269 K/UL
PMV BLD AUTO: 9.8 FL
POTASSIUM SERPL-SCNC: 4.1 MMOL/L
PROT SERPL-MCNC: 7.8 G/DL
PROT UR QL STRIP: NEGATIVE
RBC # BLD AUTO: 5.28 M/UL
SODIUM SERPL-SCNC: 140 MMOL/L
SP GR UR STRIP: 1.02 (ref 1–1.03)
TRIGL SERPL-MCNC: 37 MG/DL
URN SPEC COLLECT METH UR: NORMAL
UROBILINOGEN UR STRIP-ACNC: NEGATIVE EU/DL
WBC # BLD AUTO: 6.51 K/UL

## 2018-09-25 PROCEDURE — 87491 CHLMYD TRACH DNA AMP PROBE: CPT

## 2018-09-25 PROCEDURE — 80053 COMPREHEN METABOLIC PANEL: CPT

## 2018-09-25 PROCEDURE — 80061 LIPID PANEL: CPT

## 2018-09-25 PROCEDURE — 85025 COMPLETE CBC W/AUTO DIFF WBC: CPT

## 2018-09-25 PROCEDURE — 82306 VITAMIN D 25 HYDROXY: CPT

## 2018-09-25 PROCEDURE — 99999 PR PBB SHADOW E&M-EST. PATIENT-LVL II: CPT | Mod: PBBFAC,,, | Performed by: FAMILY MEDICINE

## 2018-09-25 PROCEDURE — 99214 OFFICE O/P EST MOD 30 MIN: CPT | Mod: S$GLB,,, | Performed by: ORTHOPAEDIC SURGERY

## 2018-09-25 PROCEDURE — 99214 OFFICE O/P EST MOD 30 MIN: CPT | Mod: S$GLB,,, | Performed by: FAMILY MEDICINE

## 2018-09-25 PROCEDURE — 83036 HEMOGLOBIN GLYCOSYLATED A1C: CPT

## 2018-09-25 PROCEDURE — 99999 PR PBB SHADOW E&M-EST. PATIENT-LVL I: CPT | Mod: PBBFAC,,, | Performed by: ORTHOPAEDIC SURGERY

## 2018-09-25 PROCEDURE — 86480 TB TEST CELL IMMUN MEASURE: CPT

## 2018-09-25 PROCEDURE — 81003 URINALYSIS AUTO W/O SCOPE: CPT

## 2018-09-25 NOTE — PROGRESS NOTES
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     Sports-relevant past history and family history:  History of cardiovascular pathology: no  Family history of cardiovascular pathology at a young age: no  History of pulmonary pathology: no  History of asthma: no  History of concussion: 1 in high school  Chronic medication use: no  Chronic supplement use: no  Chronic vitamin use: no  Other current medical concerns: no  Other history per PPE: History Form (see electronic medical record)    Assessment:   #1 pre season medical evaluation  Concussion baseline evaluation per [employer] protocol: last completed 18.09.25  Stress echocardiogram per [employer] [facility]y protocol: last performed with no evidence of stress induced myocardial ischemia on: 18.09.11    #2 ongoing medical issues:   A) none    Imaging studies reviewed as part of medical evaluation:   none    Plan:  #1  David Harmon is cleared for basketball 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      Physical exam, routine Z00.00  Sports physical Z02.5    Template reviewed 18.09.25    References:  The Preparticipation Sports Evaluation, Sept 2015  https://www.aafp.org/afp/2015/0901/p371.html    Preparticipation Physical Evaluations in Sports: A Systematic Review of Current Recommendations, July 2014  [email or web address]    Preparticipation Physical Evaluation consensus document, 2010  https://www.aafp.org/dam/AAFP/documents/patient_care/fitness/sarcogdksronam7675.pdf  [email or web address].amssm.org/Content/pdf%[email or web address]/[email or web address].pdf

## 2018-09-25 NOTE — PROGRESS NOTES
C.C. Pre-particpation physical    18 y.o. year-old [employer]       RECENT HISTORY:   1. R ankle sprain summer 2018, doing well, full go    L foot ORIF last season s/p revision Foster fracture with Dr. Chan on 9/1/17.  then revision of scar and BMAC injection to cuboid with Dr. Chan 3/13/18  Doing well, no issues.  Full go    PHYSICAL EXAM:      GEN: Alert and oriented x3. In no apparent distress.     Well-developed, well-nourished male. Observation of ears, eyes, and nose   reveal no gross abnormalities.  See ophthal report for full eye exam    CERVICAL SPINE: Full range of motion with no deficits.     BILATERAL SHOULDER EXAM: shows full symmetric range of motion with 5/5   strength throughout the supraspinatus and infraspinatus, deltoid, and   subscapularis. No evidence of instability.     BILATERAL ELBOW EXAM: Shows full and symmetric extension/flexion, and   pronation/supination, and varus/valgus stability. Negative posterolateral   rotatory instability.     BILATERAL WRIST EXAM: Shows normal and symmetric full flexion/extension   and radial/ulnar deviation.     HAND EXAM: Shows full range of motion to bilateral hands and full   strength and stability to all fingers.     LUMBAR SPINE EXAM: Shows no evidence of any scoliosis. He has full   flexion and extension with no pain and no apparent tenderness to the   spine. Negative straight leg raise bilaterally.     HIP EXAM: Shows full range of motion without pain or signs of impingement.   Symmetric internal rotation without pain.  Has 5/5 hip flexion strength and 5/5 strength testing to the entire lower extremity.   + R mild step down test  + L step down test    KNEE EXAM: Examination of bilateral knees shows symmetric range of motion   0 to 145 degrees with negative Lachman and stable to varus-valgus stress   testing. No joint line tenderness. Good quad tone. No effusion of either knee.   Left ankle anterior drawer grade 1 laxity    Bilateral  legs: NTTP over tibiae mid shaft or distal.      FOOT AND ANKLE EXAM: Shows good range of motion to bilateral ankles with   no neurologic deficit. There is a 5/5 strength exam throughout the foot   and ankle exam. There is a normal arch on both feet. There is no tenderness to palpation along either foot. - turf toe exam        IMAGING:   Xrays: No evidence of any fracture or dislocation.    CT scan: NA    MRI: NA      ASSESSMENT:   Orthopedically cleared for participation for the [employer] for the 2018-19 season.    Core strengthening exercises

## 2018-09-25 NOTE — LETTER
October 8, 2018      [facility]            [facility]  [address]  [facility] [address]  Phone: [phone]          Patient: David Harmon   MR Number: [id number]   YOB: 2000   Date of Visit: 9/25/2018       Dear [facility] :    Thank you for referring David Harmon to me for evaluation. Attached you will find relevant portions of my assessment and plan of care.    If you have questions, please do not hesitate to call me. I look forward to following David Harmon along with you.    Sincerely,    Judy Harrison MD    Enclosure  CC:  No Recipients    If you would like to receive this communication electronically, please contact [email or web address] or [phone] to request more information on [employer] access.    For providers and/or their staff who would like to refer a patient to [facility], please contact us through our one-stop-shop provider referral line, [facility], at [phone].    If you feel you have received this communication in error or would no longer like to receive these types of communications, please e-mail [email or web address]

## 2018-09-26 LAB
C TRACH DNA SPEC QL NAA+PROBE: NOT DETECTED
N GONORRHOEA DNA SPEC QL NAA+PROBE: NOT DETECTED

## 2018-09-27 LAB
M TB IFN-G CD4+ BCKGRND COR BLD-ACNC: 0.01 IU/ML
MITOGEN IGNF BCKGRD COR BLD-ACNC: >10 IU/ML
MITOGEN IGNF BCKGRD COR BLD-ACNC: NEGATIVE [IU]/ML
NIL: 0.07 IU/ML
TB2 - NIL: 0.03 IU/ML

## 2018-10-25 RX ORDER — CHOLECALCIFEROL (VITAMIN D3) 1250 MCG
1 TABLET ORAL
Qty: 20 TABLET | Refills: 3 | COMMUNITY
Start: 2018-10-25 | End: 2019-03-08

## 2018-11-08 RX ORDER — CHOLECALCIFEROL (VITAMIN D3) 1250 MCG
1 TABLET ORAL
Qty: 20 TABLET | Refills: 3 | COMMUNITY
Start: 2018-11-08 | End: 2019-03-22

## 2019-03-28 ENCOUNTER — OFFICE VISIT (OUTPATIENT)
Dept: NEUROLOGY | Facility: CLINIC | Age: 19
End: 2019-03-28
Payer: COMMERCIAL

## 2019-03-28 VITALS
SYSTOLIC BLOOD PRESSURE: 125 MMHG | WEIGHT: 206.13 LBS | HEIGHT: 76 IN | DIASTOLIC BLOOD PRESSURE: 62 MMHG | HEART RATE: 60 BPM | BODY MASS INDEX: 25.1 KG/M2

## 2019-03-28 DIAGNOSIS — S06.0X0A CONCUSSION WITHOUT LOSS OF CONSCIOUSNESS, INITIAL ENCOUNTER: Primary | ICD-10-CM

## 2019-03-28 DIAGNOSIS — H81.92 VESTIBULOPATHY OF LEFT EAR: ICD-10-CM

## 2019-03-28 PROCEDURE — 99999 PR PBB SHADOW E&M-EST. PATIENT-LVL III: ICD-10-PCS | Mod: PBBFAC,,, | Performed by: PSYCHIATRY & NEUROLOGY

## 2019-03-28 PROCEDURE — 99244 PR OFFICE CONSULTATION,LEVEL IV: ICD-10-PCS | Mod: S$GLB,,, | Performed by: PSYCHIATRY & NEUROLOGY

## 2019-03-28 PROCEDURE — 99999 PR PBB SHADOW E&M-EST. PATIENT-LVL III: CPT | Mod: PBBFAC,,, | Performed by: PSYCHIATRY & NEUROLOGY

## 2019-03-28 PROCEDURE — 99244 OFF/OP CNSLTJ NEW/EST MOD 40: CPT | Mod: S$GLB,,, | Performed by: PSYCHIATRY & NEUROLOGY

## 2019-03-28 NOTE — PROGRESS NOTES
Subjective:       Patient ID: David Harmon is a 18 y.o. male.    Chief Complaint:  Concussion      Consultation Requested by:   Everett Vargas Md  [address]  [address]  [place] [address]    History of Present Illness  18-year-old male presents for evaluation sustained on 03/26/2019.  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  He did not lose any consciousness but he did lose tone and fell to the ground on all fours.  He tried to get up and fell to the side.  He notes while he did not lose consciousness he remained aware of the entire situation but was not verbally responsive initially.  He was immediately dizzy and confused.  He has a history of having a concussion in high school basketball when his head hit the ground but he did not lose consciousness.  He had a stating her and middle school football which lasted for a few seconds of tingling numbness and burning down 1 of his arms but then resolved.  He notes no worsening of his symptoms actually feels a trajectory of improvement now that he is about 48 hr out from his initial injury.  He does not have any history of migraine headache, anxiety, depression.  He has no family history of migraine headache.  He has no history of sleep apnea or narcolepsy.  He has no history of ADD or learning disability.    Past Medical History:   Diagnosis Date    Fracture of base of fifth metatarsal bone of right foot at metaphyseal-diaphyseal junction with routine healing 1/20/2018       History reviewed. No pertinent surgical history.    History reviewed. No pertinent family history.    Social History     Socioeconomic History    Marital status: Single     Spouse name: None    Number of children: None    Years of education: None    Highest education level: None   Occupational History    None   Social Needs    Financial resource  strain: None    Food insecurity:     Worry: None     Inability: None    Transportation needs:     Medical: None     Non-medical: None   Tobacco Use    Smoking status: Never Smoker    Smokeless tobacco: Never Used   Substance and Sexual Activity    Alcohol use: None    Drug use: None    Sexual activity: None   Lifestyle    Physical activity:     Days per week: None     Minutes per session: None    Stress: None   Relationships    Social connections:     Talks on phone: None     Gets together: None     Attends Congregational service: None     Active member of club or organization: None     Attends meetings of clubs or organizations: None     Relationship status: None    Intimate partner violence:     Fear of current or ex partner: None     Emotionally abused: None     Physically abused: None     Forced sexual activity: None   Other Topics Concern    None   Social History Narrative    None       Review of Systems  Review of Systems   Constitutional: Positive for fatigue.   Eyes: Negative for photophobia and visual disturbance.   Musculoskeletal: Positive for neck stiffness. Negative for neck pain.   Neurological: Positive for dizziness and headaches.   Psychiatric/Behavioral: Negative for sleep disturbance.   All other systems reviewed and are negative.      Objective:     Vitals:    03/28/19 1203   BP: 125/62   Pulse: 60      Physical Exam   Constitutional: He is oriented to person, place, and time. He appears well-developed and well-nourished. No distress.   HENT:   Head: Normocephalic and atraumatic.   Right Ear: Hearing normal.   Left Ear: Hearing normal.   Eyes: Pupils are equal, round, and reactive to light. EOM are normal. Right eye exhibits normal extraocular motion and no nystagmus. Left eye exhibits normal extraocular motion and no nystagmus.   Neck: Normal range of motion. Neck supple. No spinous process tenderness and no muscular tenderness present. Carotid bruit is not present. No neck rigidity.    Musculoskeletal: Normal range of motion.   Neurological: He is alert and oriented to person, place, and time. He has normal strength and normal reflexes. He displays no atrophy, no tremor and normal reflexes. No cranial nerve deficit or sensory deficit. He exhibits normal muscle tone. He has a normal Finger-Nose-Finger Test and a normal Heel to Turner Test. He displays a negative Romberg sign. Gait normal. Coordination and gait normal. GCS eye subscore is 4. GCS verbal subscore is 5. GCS motor subscore is 6.   Reflex Scores:       Tricep reflexes are 2+ on the right side and 2+ on the left side.       Bicep reflexes are 2+ on the right side and 2+ on the left side.       Brachioradialis reflexes are 2+ on the right side and 2+ on the left side.       Patellar reflexes are 2+ on the right side and 2+ on the left side.       Achilles reflexes are 2+ on the right side and 2+ on the left side.  Fundoscopic exam shows no papilledema, no hemorrhage, no exudates bilaterally.    Cranial Nerves 2-12 are without focal deficit.      Psychiatric: He has a normal mood and affect. His speech is normal and behavior is normal. Thought content normal.         NEUROLOGICAL EXAMINATION:     MENTAL STATUS   Oriented to person, place, and time.   Attention: normal. Concentration: normal.   Speech: speech is normal   Level of consciousness: alert  Knowledge: good.   Able to name object. Able to read. Able to repeat. Able to write.        Point of convergence is less than 20 cm.  There is no nystagmus on examination.  Extraocular motion is intact.    Donna on foam pad is abnormal, indicating vestibulopathy, please see scanned sheet into EMR.     CRANIAL NERVES   Cranial nerves II through XII intact.     CN II   Visual fields full to confrontation.   Visual acuity: normal    CN III, IV, VI   Pupils are equal, round, and reactive to light.  Extraocular motions are normal.     MOTOR EXAM   Muscle bulk: normal  Overall muscle tone:  normal  Right arm tone: normal  Left arm tone: normal  Right leg tone: normal  Left leg tone: normal    Strength   Strength 5/5 throughout.     REFLEXES     Reflexes   Right brachioradialis: 2+  Left brachioradialis: 2+  Right biceps: 2+  Left biceps: 2+  Right triceps: 2+  Left triceps: 2+  Right patellar: 2+  Left patellar: 2+  Right achilles: 2+  Left achilles: 2+  Right : 2+  Left : 2+  Right plantar: normal  Left plantar: normal    SENSORY EXAM   Light touch normal.   Vibration normal.   Proprioception normal.   Pinprick normal.     GAIT AND COORDINATION     Gait  Gait: normal     Coordination   Finger to nose coordination: normal  Heel to shin coordination: normal   I have spent more than 50% of 45 min visit in guidance, counseling and discussion treatment options.  I have coordinated care with the patient's primary Sports Medicine doctor via telephone call.          Assessment/Plan:     Problem List Items Addressed This Visit     None      Visit Diagnoses     Concussion without loss of consciousness, initial encounter    -  Primary    Vestibulopathy of left ear            18-year-old male presents for evaluation of concussion sustained on 03/26/2019.  At this point in time he does have a mild vestibulopathy.  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  He will keep tabs with his primary Sports Medicine doctor throughout the weekend.  Depending on how he is doing on Monday consider formal referral to vestibular rehab.  I have given the patient's  my number in case there is any issue or communication problem.  I suspect he will do well as his trajectory of recovery has already been significant.  I will see him back on an as-needed basis.     The patient verbalizes understanding and agreement with the  treatment plan. Questions were sought and answered to his stated verbal satisfaction.        Phillip Junior MD    This note is dictated on Dragon Natural Speaking word recognition program. There are word recognition mistakes that are occasionally missed on review.

## 2019-03-28 NOTE — LETTER
March 28, 2019      Everett Vargas MD  [address]  [address]  [facility] [address]           [facility] [place] [facility]  [address]  [facility] [address]  Phone: [phone]  Fax: [phone]          Patient: David Harmon   MR Number: [id number]   YOB: 2000   Date of Visit: 3/28/2019       Dear Dr. Everett Vargas:    Thank you for referring David Harmon to me for evaluation. Attached you will find relevant portions of my assessment and plan of care.    If you have questions, please do not hesitate to call me. I look forward to following David Harmon along with you.    Sincerely,    Alfredo Junior III, MD    Enclosure  CC:  No Recipients    If you would like to receive this communication electronically, please contact [email or web address] or [phone] to request more information on [employer] Link access.    For providers and/or their staff who would like to refer a patient to [facility], please contact us through our one-stop-shop provider referral line, [facility] [place], at [phone].    If you feel you have received this communication in error or would no longer like to receive these types of communications, please e-mail [email or web address]

## 2019-04-10 ENCOUNTER — OFFICE VISIT (OUTPATIENT)
Dept: SPORTS MEDICINE | Facility: CLINIC | Age: 19
End: 2019-04-10

## 2019-04-10 DIAGNOSIS — Z02.5 SPORTS PHYSICAL: ICD-10-CM

## 2019-04-10 DIAGNOSIS — Z00.00 PHYSICAL EXAM, ROUTINE: Primary | ICD-10-CM

## 2019-04-10 DIAGNOSIS — Z00.00 WELLNESS EXAMINATION: Primary | ICD-10-CM

## 2019-04-10 PROCEDURE — 99499 UNLISTED E&M SERVICE: CPT | Mod: S$GLB,,, | Performed by: ORTHOPAEDIC SURGERY

## 2019-04-10 PROCEDURE — 99499 UNLISTED E&M SERVICE: CPT | Mod: S$GLB,,, | Performed by: FAMILY MEDICINE

## 2019-04-10 PROCEDURE — 99499 NO LOS: ICD-10-PCS | Mod: S$GLB,,, | Performed by: FAMILY MEDICINE

## 2019-04-10 PROCEDURE — 99499 NO LOS: ICD-10-PCS | Mod: S$GLB,,, | Performed by: ORTHOPAEDIC SURGERY

## 2019-04-10 NOTE — PROGRESS NOTES
ROLAC. Post-particpation physical    18 y.o. year-old [employer]     [place]    RECENT HISTORY:   1. Right plantar fasciitis: denies pain. Symptoms have resolved.  2. March 2019: Concussion 3//26/19: Denies symptoms. Completed Stage 1, 2 of protocol. Next plan for stage 3 when returns.      PHYSICAL EXAM:      GEN: Alert and oriented x3. In no apparent distress.     Well-developed, well-nourished male. Observation of ears, eyes, and nose   reveal no gross abnormalities.  See ophthal report for full eye exam    CERVICAL SPINE: Full range of motion with no deficits.     BILATERAL SHOULDER EXAM: shows full symmetric range of motion with 5/5   strength throughout the supraspinatus and infraspinatus, deltoid, and   subscapularis. No evidence of instability.     BILATERAL ELBOW EXAM: Shows full and symmetric extension/flexion, and   pronation/supination, and varus/valgus stability. Negative posterolateral   rotatory instability.     BILATERAL WRIST EXAM: Shows normal and symmetric full flexion/extension   and radial/ulnar deviation.     HAND EXAM: Shows full range of motion to bilateral hands and full   strength and stability to all fingers.     LUMBAR SPINE EXAM: Shows no evidence of any scoliosis. He has full   flexion and extension with no pain and no apparent tenderness to the   spine. Negative straight leg raise bilaterally.     HIP EXAM: Shows full range of motion without pain or signs of impingement.   Symmetric internal rotation without pain.  Has 5/5 hip flexion strength and 5/5 strength testing to the entire lower extremity.     KNEE EXAM: Examination of bilateral knees shows symmetric range of motion   0 to 145 degrees with negative Lachman and stable to varus-valgus stress   testing. No joint line tenderness. Good quad tone. No effusion of either knee.     Bilateral legs: NTTP over tibiae mid shaft or distal.      FOOT AND ANKLE EXAM: Shows good range of motion to  bilateral ankles with   no neurologic deficit. There is a 5/5 strength exam throughout the foot   and ankle exam. There is a normal arch on both feet. There is no tenderness to palpation along either foot. - turf toe exam  nttp on palpation of calc.      IMAGING:   Xrays: No evidence of any fracture or dislocation.    CT scan:    MRI:      ASSESSMENT:   Will need to complete concussion protocol prior to return to activity.

## 2019-04-29 ENCOUNTER — OFFICE VISIT (OUTPATIENT)
Dept: SPORTS MEDICINE | Facility: CLINIC | Age: 19
End: 2019-04-29
Payer: COMMERCIAL

## 2019-04-29 VITALS — HEIGHT: 76 IN | WEIGHT: 206 LBS | BODY MASS INDEX: 25.09 KG/M2

## 2019-04-29 DIAGNOSIS — S06.0X0D CONCUSSION WITHOUT LOSS OF CONSCIOUSNESS, SUBSEQUENT ENCOUNTER: Primary | ICD-10-CM

## 2019-04-29 PROCEDURE — 99999 PR PBB SHADOW E&M-EST. PATIENT-LVL II: CPT | Mod: PBBFAC,,, | Performed by: FAMILY MEDICINE

## 2019-04-29 PROCEDURE — 99999 PR PBB SHADOW E&M-EST. PATIENT-LVL II: ICD-10-PCS | Mod: PBBFAC,,, | Performed by: FAMILY MEDICINE

## 2019-04-29 PROCEDURE — 99499 UNLISTED E&M SERVICE: CPT | Mod: S$GLB,,, | Performed by: FAMILY MEDICINE

## 2019-04-29 PROCEDURE — 99499 NO LOS: ICD-10-PCS | Mod: S$GLB,,, | Performed by: FAMILY MEDICINE

## 2019-04-29 NOTE — PROGRESS NOTES
See prior notes    History of present illness (HPI):  See [facility] Concussion Assessment Tool Follow-Up Form from today, scanned into record    Physical examination (PE):  General: David Harmon is well-developed, well-nourished, appears stated age, in no acute distress, alert and oriented to time, place and person.     Nursing note and vitals reviewed.  Constitutional: as above  HENT:    Head: Normocephalic and atraumatic.    Nose: Nose normal.    Eyes: Conjunctivae and EOM are normal.   Pulmonary/Chest: Effort normal. No respiratory distress.   Neurological: Alert. Coordination normal.   Psychiatric: Normal mood and affect. Behavior is normal.      From SCAT5:  Neck examination:   Range of motion: Normal   Tenderness: None   Upper and lower limb sensation and strength: Normal  Balance examination:    The non-dominant foot was tested   Testing surface: Hard floor   Double leg stance: appropriate   Single leg stance: 1 error   Tandem stance: appropriate   Tandem gait: n/a  Coordination examination:   Upper limb coordination: normal   [Finger-to-nose testing: much improved speed, maintained accuracy]   [Vestibular testing: appropriate]      ASSESSMENT & PLAN  Assessment:  #1 concussion #1, w/out loss of consciousness  No evidence of myelopathy / spinal cord pathology  No evidence of focal neurologic deficit  No evidence of skull fracture    Imaging studies reviewed:  N/a    Plan:    Reassuring evaluation  Conclude RTP protocol, per [facility] guidelines    Discussed the severity of concussions and of multiple concussions  Discussed that the negative effect(s) of concussions is cumulative  Discussed head safety and protection in sports           Yes (+)   No (-)  Neuropsychological testing:     + (prior, see ADELSO documentation)  administered, reviewed, and shared with the patient  (and family, if present) at this visit.    Follow up:  Per pt / PT     Should symptoms acutely worsen, or should new symptoms arise, the patient should  immediately present to the Emergency Department for further evaluation.

## 2019-06-18 NOTE — PROGRESS NOTES
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     Sports-relevant past history and family history:  History of cardiovascular pathology: no  Family history of cardiovascular pathology at a young age: no  History of pulmonary pathology: no  History of asthma: no  History of concussion:   -1 in high school  -1 at end of 2018-19 season  Chronic medication use: no  Chronic supplement use: no  Chronic vitamin use: no  Other current medical concerns: no  Other history per PPE: History Form (see electronic medical record)    Assessment:   #1 post season medical review  Concussion baseline evaluation per [facility] protocol: last completed 18.09.25  Stress echocardiogram per [facility] Toby protocol: last performed with no evidence of stress induced myocardial ischemia on: 18.09.11    #2 ongoing medical issues:   A) recent concussion    Imaging studies reviewed as part of medical evaluation:   none    Plan:  #1  Complete.   Acute and ongoing concerns as below:     #2  A)  Fully recovered and cleared per [facility] protocols       Physical exam, routine Z00.00  Sports physical Z02.5        Template reviewed 19.03.25        References:  The Preparticipation Sports Evaluation, Sept 2015  https://www.aafp.org/afp/2015/0901/p371.html     Preparticipation Physical Evaluations in Sports: A Systematic Review of Current Recommendations, July 2014  [email or web address]     Preparticipation Physical Evaluation consensus document, 2010  https://www.aafp.org/dam/AAFP/documents/patient_care/fitness/ttzzvbjypuugba6268.pdf  Https://www.amssm.org/Content/pdf%20files/LLO5004YovbtpxZokk.pdf      https://www.[employer].Americanflat/contents/vitamin-d-deficiency-in-adults-definition-clinical-manifestations-and-treatment?search=vitamin%20d%20replacement&source=search_result&selectedTitle=1~150&usage_type=default&display_rank=1        Physical exam, routine Z00.00  Sports physical Z02.5    Template reviewed 18.09.25    References:  The Preparticipation Sports Evaluation, Sept 2015  https://www.aafp.org/afp/2015/0901/p371.html    Preparticipation Physical Evaluations in Sports: A Systematic Review of Current Recommendations, July 2014  [email or web address].pdf    Preparticipation Physical Evaluation consensus document, 2010  https://www.aafp.org/dam/AAFP/documents/patient_care/fitness/ytvcuagaqmribp2353.pdf  Https://www.amssm.org/Content/pdf%20files/JMA6711TfvmxpbIpwv.pdf

## 2019-08-30 ENCOUNTER — TELEPHONE (OUTPATIENT)
Dept: SPORTS MEDICINE | Facility: CLINIC | Age: 19
End: 2019-08-30

## 2019-08-30 DIAGNOSIS — Z02.5 SPORTS PHYSICAL: Primary | ICD-10-CM

## 2019-09-10 ENCOUNTER — HOSPITAL ENCOUNTER (OUTPATIENT)
Dept: CARDIOLOGY | Facility: CLINIC | Age: 19
Discharge: HOME | End: 2019-09-10
Attending: FAMILY MEDICINE

## 2019-09-10 ENCOUNTER — CLINICAL SUPPORT (OUTPATIENT)
Dept: INTERNAL MEDICINE | Facility: CLINIC | Age: 19
End: 2019-09-10

## 2019-09-10 VITALS — WEIGHT: 210 LBS | HEIGHT: 76 IN | BODY MASS INDEX: 25.57 KG/M2

## 2019-09-10 DIAGNOSIS — Z00.00 ROUTINE GENERAL MEDICAL EXAMINATION AT A HEALTH CARE FACILITY: Primary | ICD-10-CM

## 2019-09-10 DIAGNOSIS — Z02.5 SPORTS PHYSICAL: ICD-10-CM

## 2019-09-10 LAB
25(OH)D3+25(OH)D2 SERPL-MCNC: 35 NG/ML (ref 30–96)
ALBUMIN SERPL BCP-MCNC: 4.9 G/DL (ref 3.5–5.2)
ALP SERPL-CCNC: 104 U/L (ref 55–135)
ALT SERPL W/O P-5'-P-CCNC: 93 U/L (ref 10–44)
ANION GAP SERPL CALC-SCNC: 9 MMOL/L (ref 8–16)
ASCENDING AORTA: 2.53 CM
AST SERPL-CCNC: 63 U/L (ref 10–40)
AV INDEX (PROSTH): 0.8
AV MEAN GRADIENT: 5 MMHG
AV PEAK GRADIENT: 8 MMHG
AV VALVE AREA: 2.75 CM2
AV VELOCITY RATIO: 0.8
BASOPHILS # BLD AUTO: 0.05 K/UL (ref 0–0.2)
BASOPHILS NFR BLD: 0.6 % (ref 0–1.9)
BILIRUB SERPL-MCNC: 0.5 MG/DL (ref 0.1–1)
BILIRUB UR QL STRIP: NEGATIVE
BSA FOR ECHO PROCEDURE: 2.26 M2
BUN SERPL-MCNC: 12 MG/DL (ref 6–20)
C TRACH DNA SPEC QL NAA+PROBE: NOT DETECTED
CALCIUM SERPL-MCNC: 10.3 MG/DL (ref 8.7–10.5)
CHLORIDE SERPL-SCNC: 103 MMOL/L (ref 95–110)
CHOLEST SERPL-MCNC: 151 MG/DL (ref 120–199)
CHOLEST/HDLC SERPL: 2.6 {RATIO} (ref 2–5)
CLARITY UR REFRACT.AUTO: CLEAR
CO2 SERPL-SCNC: 30 MMOL/L (ref 23–29)
COLOR UR AUTO: YELLOW
CREAT SERPL-MCNC: 1.3 MG/DL (ref 0.5–1.4)
CV ECHO LV RWT: 0.47 CM
CV STRESS BASE HR: 57 BPM
DIASTOLIC BLOOD PRESSURE: 49 MMHG
DIFFERENTIAL METHOD: ABNORMAL
DOP CALC AO PEAK VEL: 1.42 M/S
DOP CALC AO VTI: 31.57 CM
DOP CALC LVOT AREA: 3.4 CM2
DOP CALC LVOT DIAMETER: 2.09 CM
DOP CALC LVOT PEAK VEL: 1.13 M/S
DOP CALC LVOT STROKE VOLUME: 86.96 CM3
DOP CALCLVOT PEAK VEL VTI: 25.36 CM
E WAVE DECELERATION TIME: 172.02 MSEC
E/A RATIO: 2.69
E/E' RATIO: 4.82 M/S
ECHO LV POSTERIOR WALL: 1.1 CM (ref 0.6–1.1)
EOSINOPHIL # BLD AUTO: 0.1 K/UL (ref 0–0.5)
EOSINOPHIL NFR BLD: 1.1 % (ref 0–8)
ERYTHROCYTE [DISTWIDTH] IN BLOOD BY AUTOMATED COUNT: 13.2 % (ref 11.5–14.5)
EST. GFR  (AFRICAN AMERICAN): >60 ML/MIN/1.73 M^2
EST. GFR  (NON AFRICAN AMERICAN): >60 ML/MIN/1.73 M^2
ESTIMATED AVG GLUCOSE: 103 MG/DL (ref 68–131)
FRACTIONAL SHORTENING: 34 % (ref 28–44)
GLUCOSE SERPL-MCNC: 97 MG/DL (ref 70–110)
GLUCOSE UR QL STRIP: NEGATIVE
HBA1C MFR BLD HPLC: 5.2 % (ref 4–5.6)
HCT VFR BLD AUTO: 52.4 % (ref 40–54)
HDLC SERPL-MCNC: 58 MG/DL (ref 40–75)
HDLC SERPL: 38.4 % (ref 20–50)
HGB BLD-MCNC: 16.3 G/DL (ref 14–18)
HGB UR QL STRIP: ABNORMAL
IMM GRANULOCYTES # BLD AUTO: 0.03 K/UL (ref 0–0.04)
IMM GRANULOCYTES NFR BLD AUTO: 0.4 % (ref 0–0.5)
INTERVENTRICULAR SEPTUM: 0.91 CM (ref 0.6–1.1)
IVRT: 0.07 MSEC
KETONES UR QL STRIP: NEGATIVE
LA MAJOR: 5.4 CM
LA MINOR: 5.41 CM
LA WIDTH: 4.27 CM
LDLC SERPL CALC-MCNC: 82.6 MG/DL (ref 63–159)
LEFT ATRIUM SIZE: 3.46 CM
LEFT ATRIUM VOLUME INDEX: 30 ML/M2
LEFT ATRIUM VOLUME: 67.88 CM3
LEFT INTERNAL DIMENSION IN SYSTOLE: 3.08 CM (ref 2.1–4)
LEFT VENTRICLE DIASTOLIC VOLUME INDEX: 45.14 ML/M2
LEFT VENTRICLE DIASTOLIC VOLUME: 102.1 ML
LEFT VENTRICLE MASS INDEX: 73 G/M2
LEFT VENTRICLE SYSTOLIC VOLUME INDEX: 16.5 ML/M2
LEFT VENTRICLE SYSTOLIC VOLUME: 37.22 ML
LEFT VENTRICULAR INTERNAL DIMENSION IN DIASTOLE: 4.69 CM (ref 3.5–6)
LEFT VENTRICULAR MASS: 165 G
LEUKOCYTE ESTERASE UR QL STRIP: NEGATIVE
LV LATERAL E/E' RATIO: 3.92 M/S
LV SEPTAL E/E' RATIO: 6.27 M/S
LYMPHOCYTES # BLD AUTO: 3.5 K/UL (ref 1–4.8)
LYMPHOCYTES NFR BLD: 42 % (ref 18–48)
MCH RBC QN AUTO: 27.9 PG (ref 27–31)
MCHC RBC AUTO-ENTMCNC: 31.1 G/DL (ref 32–36)
MCV RBC AUTO: 90 FL (ref 82–98)
MONOCYTES # BLD AUTO: 0.6 K/UL (ref 0.3–1)
MONOCYTES NFR BLD: 7.7 % (ref 4–15)
MV PEAK A VEL: 0.35 M/S
MV PEAK E VEL: 0.94 M/S
N GONORRHOEA DNA SPEC QL NAA+PROBE: NOT DETECTED
NEUTROPHILS # BLD AUTO: 4 K/UL (ref 1.8–7.7)
NEUTROPHILS NFR BLD: 48.2 % (ref 38–73)
NITRITE UR QL STRIP: NEGATIVE
NONHDLC SERPL-MCNC: 93 MG/DL
NRBC BLD-RTO: 0 /100 WBC
OHS CV CPX 1 MINUTE RECOVERY HEART RATE: 126 BPM
OHS CV CPX 85 PERCENT MAX PREDICTED HEART RATE MALE: 169
OHS CV CPX ESTIMATED METS: 17
OHS CV CPX MAX PREDICTED HEART RATE: 199
OHS CV CPX PATIENT IS FEMALE: 0
OHS CV CPX PATIENT IS MALE: 1
OHS CV CPX PEAK DIASTOLIC BLOOD PRESSURE: 61 MMHG
OHS CV CPX PEAK HEAR RATE: 186 BPM
OHS CV CPX PEAK RATE PRESSURE PRODUCT: NORMAL
OHS CV CPX PEAK SYSTOLIC BLOOD PRESSURE: 198 MMHG
OHS CV CPX PERCENT MAX PREDICTED HEART RATE ACHIEVED: 93
OHS CV CPX RATE PRESSURE PRODUCT PRESENTING: 7809
PH UR STRIP: 6 [PH] (ref 5–8)
PISA TR MAX VEL: 2.47 M/S
PLATELET # BLD AUTO: 259 K/UL (ref 150–350)
PMV BLD AUTO: 9.8 FL (ref 9.2–12.9)
POTASSIUM SERPL-SCNC: 4.7 MMOL/L (ref 3.5–5.1)
PROT SERPL-MCNC: 8.5 G/DL (ref 6–8.4)
PROT UR QL STRIP: NEGATIVE
PULM VEIN S/D RATIO: 0.63
PV PEAK D VEL: 0.72 M/S
PV PEAK S VEL: 0.45 M/S
RA MAJOR: 5.08 CM
RA PRESSURE: 3 MMHG
RA WIDTH: 3.95 CM
RBC # BLD AUTO: 5.84 M/UL (ref 4.6–6.2)
RETIRED EF AND QEF - SEE NOTES: 68 %
RIGHT VENTRICULAR END-DIASTOLIC DIMENSION: 3.29 CM
RV TISSUE DOPPLER FREE WALL SYSTOLIC VELOCITY 1 (APICAL 4 CHAMBER VIEW): 13.05 CM/S
SINUS: 2.73 CM
SODIUM SERPL-SCNC: 142 MMOL/L (ref 136–145)
SP GR UR STRIP: 1.02 (ref 1–1.03)
STJ: 2.44 CM
STRESS ECHO POST EXERCISE DUR MIN: 13 MINUTES
STRESS ECHO POST EXERCISE DUR SEC: 25 SECONDS
SYSTOLIC BLOOD PRESSURE: 137 MMHG
TDI LATERAL: 0.24 M/S
TDI SEPTAL: 0.15 M/S
TDI: 0.2 M/S
TR MAX PG: 24 MMHG
TRICUSPID ANNULAR PLANE SYSTOLIC EXCURSION: 2.95 CM
TRIGL SERPL-MCNC: 52 MG/DL (ref 30–150)
TV REST PULMONARY ARTERY PRESSURE: 27 MMHG
URN SPEC COLLECT METH UR: ABNORMAL
WBC # BLD AUTO: 8.35 K/UL (ref 3.9–12.7)

## 2019-09-10 PROCEDURE — 93325 DOPPLER ECHO COLOR FLOW MAPG: CPT

## 2019-09-10 PROCEDURE — 93351 STRESS TTE COMPLETE: CPT | Mod: 26,,, | Performed by: INTERNAL MEDICINE

## 2019-09-10 PROCEDURE — 87491 CHLMYD TRACH DNA AMP PROBE: CPT

## 2019-09-10 PROCEDURE — 93320 DOPPLER ECHO COMPLETE: CPT | Mod: 26,,, | Performed by: INTERNAL MEDICINE

## 2019-09-10 PROCEDURE — 83036 HEMOGLOBIN GLYCOSYLATED A1C: CPT

## 2019-09-10 PROCEDURE — 93325 STRESS ECHO (CUPID ONLY): ICD-10-PCS | Mod: 26,,, | Performed by: INTERNAL MEDICINE

## 2019-09-10 PROCEDURE — 93351 STRESS ECHO (CUPID ONLY): ICD-10-PCS | Mod: 26,,, | Performed by: INTERNAL MEDICINE

## 2019-09-10 PROCEDURE — 81003 URINALYSIS AUTO W/O SCOPE: CPT

## 2019-09-10 PROCEDURE — 82306 VITAMIN D 25 HYDROXY: CPT

## 2019-09-10 PROCEDURE — 80061 LIPID PANEL: CPT

## 2019-09-10 PROCEDURE — 93325 DOPPLER ECHO COLOR FLOW MAPG: CPT | Mod: 26,,, | Performed by: INTERNAL MEDICINE

## 2019-09-10 PROCEDURE — 80053 COMPREHEN METABOLIC PANEL: CPT

## 2019-09-10 PROCEDURE — 87086 URINE CULTURE/COLONY COUNT: CPT

## 2019-09-10 PROCEDURE — 85025 COMPLETE CBC W/AUTO DIFF WBC: CPT

## 2019-09-10 PROCEDURE — 93320 STRESS ECHO (CUPID ONLY): ICD-10-PCS | Mod: 26,,, | Performed by: INTERNAL MEDICINE

## 2019-09-11 LAB — BACTERIA UR CULT: NO GROWTH

## 2019-09-30 ENCOUNTER — OFFICE VISIT (OUTPATIENT)
Dept: SPORTS MEDICINE | Facility: CLINIC | Age: 19
End: 2019-09-30

## 2019-09-30 DIAGNOSIS — Z02.5 ROUTINE SPORTS EXAMINATION: ICD-10-CM

## 2019-09-30 DIAGNOSIS — Z00.00 ROUTINE MEDICAL EXAM: Primary | ICD-10-CM

## 2019-09-30 PROCEDURE — 99999 PR PBB SHADOW E&M-EST. PATIENT-LVL I: CPT | Mod: PBBFAC,,, | Performed by: FAMILY MEDICINE

## 2019-09-30 PROCEDURE — 99999 PR PBB SHADOW E&M-EST. PATIENT-LVL I: ICD-10-PCS | Mod: PBBFAC,,, | Performed by: FAMILY MEDICINE

## 2019-09-30 PROCEDURE — 99499 UNLISTED E&M SERVICE: CPT | Mod: S$GLB,,, | Performed by: ORTHOPAEDIC SURGERY

## 2019-09-30 PROCEDURE — 99499 NO LOS: ICD-10-PCS | Mod: S$GLB,,, | Performed by: FAMILY MEDICINE

## 2019-09-30 PROCEDURE — 99499 NO LOS: ICD-10-PCS | Mod: S$GLB,,, | Performed by: ORTHOPAEDIC SURGERY

## 2019-09-30 PROCEDURE — 99499 UNLISTED E&M SERVICE: CPT | Mod: S$GLB,,, | Performed by: FAMILY MEDICINE

## 2019-09-30 NOTE — PROGRESS NOTES
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     Sports-relevant history and family history:  History of cardiovascular pathology: no  Family history of cardiovascular pathology at a young age: no  History of pulmonary pathology: no  History of asthma: no  History of concussion: see up to date concussion history, per ADELSO protocol  Chronic medication use: no  Chronic supplement use: no  Chronic vitamin use: no  History of drug allergy: no  History of food allergy: no  Other current medical concerns: no  Other history per PPE: History Form (see electronic medical record)    Assessment:   #1 pre season medical evaluation  Concussion baseline evaluation per [facility] protocol: last completed: 19.09.30  Stress echocardiogram per [employer] Toby protocol: last performed with no evidence of stress induced myocardial ischemia on: 19.09.10  Annual influenza vaccination on (prefered in October):     #2 ongoing medical issues:   A) elevation in liver enzymes    Imaging studies reviewed as part of medical evaluation:   none    Plan:  #1  David Harmon is cleared for basketball 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    #2  A)   Unclear etiology, no other evidence of hepatic pathology  He does note some increased EtOH intake, we discussed that this may lead to enzyme elevations  Will follow liver labs      Physical exam, routine Z00.00  Sports physical Z02.5    Template reviewed 18.09.25    References:  The Preparticipation Sports Evaluation, Sept 2015  https://www.aafp.org/afp/2015/0901/p371.html    Preparticipation Physical Evaluations in Sports: A Systematic  Review of Current Recommendations, July 2014  [email or web address].pdf    Preparticipation Physical Evaluation consensus document, 2010  https://www.aafp.org/dam/AAFP/documents/patient_care/fitness/aixfkmadurakqm5422.pdf  Https://www.ams.org/Content/pdf%20files/ASZ2005QeiomczLaxp.pdf

## 2019-09-30 NOTE — PROGRESS NOTES
TONY Pre-participation physical    19 y.o. year-old [employer] . History of quad contusion last summer league and ankle sprain as well. Caused him to miss 1 month. No recent issues with either.     s/p revision Foster fracture with Dr. Chan on 9/1/17.  then revision of scar and BMAC injection to cuboid with Dr. Chan 3/13/18        RECENT HISTORY:   1. No current or recent symptoms      PHYSICAL EXAM:      GEN: Alert and oriented x3. In no apparent distress.     Well-developed, well-nourished male. Observation of ears, eyes, and nose   reveal no gross abnormalities.  See ophthal report for full eye exam    CERVICAL SPINE: Full range of motion with no deficits.     BILATERAL SHOULDER EXAM: shows full symmetric range of motion with 5/5   strength throughout the supraspinatus and infraspinatus, deltoid, and   subscapularis. No evidence of instability.     BILATERAL ELBOW EXAM: Shows full and symmetric extension/flexion, and   pronation/supination, and varus/valgus stability. Negative posterolateral   rotatory instability.     BILATERAL WRIST EXAM: Shows normal and symmetric full flexion/extension   and radial/ulnar deviation.     HAND EXAM: Shows full range of motion to bilateral hands and full   strength and stability to all fingers.     LUMBAR SPINE EXAM: Shows no evidence of any scoliosis. He has full   flexion and extension with no pain and no apparent tenderness to the   spine. Negative straight leg raise bilaterally.     HIP EXAM: Shows full range of motion without pain or signs of impingement.   Symmetric internal rotation without pain.  Has 5/5 hip flexion strength and 5/5 strength testing to the entire lower extremity.   RLE  ROM: (* = pain)    Flexion:    120 degrees  External rotation: 45 degrees  Internal rotation with axial load: 30 degrees  Internal rotation without axial load: 40 degrees  Abduction:  45 degrees  Adduction:   20 degrees    LLE  Symmetric       KNEE EXAM: Examination  of bilateral knees shows symmetric range of motion   0 to 145 degrees with negative Lachman and stable to varus-valgus stress   testing. No joint line tenderness. Good quad tone. No effusion of either knee.     Bilateral legs: NTTP over tibiae mid shaft or distal.      FOOT AND ANKLE EXAM: Shows good range of motion to bilateral ankles with   no neurologic deficit. There is a 5/5 strength exam throughout the foot   and ankle exam. There is a normal arch on both feet. There is no tenderness to palpation along either foot. - turf toe exam      ASSESSMENT:   Orthopedically cleared for participation for the [employer] for the 2019-20 season.

## 2019-10-03 ENCOUNTER — OFFICE VISIT (OUTPATIENT)
Dept: SPORTS MEDICINE | Facility: CLINIC | Age: 19
End: 2019-10-03
Payer: COMMERCIAL

## 2019-10-03 VITALS
DIASTOLIC BLOOD PRESSURE: 76 MMHG | SYSTOLIC BLOOD PRESSURE: 118 MMHG | TEMPERATURE: 98 F | HEART RATE: 84 BPM | WEIGHT: 210 LBS | HEIGHT: 76 IN | BODY MASS INDEX: 25.57 KG/M2

## 2019-10-03 DIAGNOSIS — R11.10 VOMITING, INTRACTABILITY OF VOMITING NOT SPECIFIED, PRESENCE OF NAUSEA NOT SPECIFIED, UNSPECIFIED VOMITING TYPE: ICD-10-CM

## 2019-10-03 DIAGNOSIS — R53.83 LETHARGY: ICD-10-CM

## 2019-10-03 DIAGNOSIS — K29.70 VIRAL GASTRITIS: Primary | ICD-10-CM

## 2019-10-03 DIAGNOSIS — R19.5 LOOSE STOOLS: ICD-10-CM

## 2019-10-03 DIAGNOSIS — R11.0 NAUSEA: ICD-10-CM

## 2019-10-03 PROCEDURE — 3008F BODY MASS INDEX DOCD: CPT | Mod: CPTII,S$GLB,, | Performed by: FAMILY MEDICINE

## 2019-10-03 PROCEDURE — 99214 OFFICE O/P EST MOD 30 MIN: CPT | Mod: S$GLB,,, | Performed by: FAMILY MEDICINE

## 2019-10-03 PROCEDURE — 3008F PR BODY MASS INDEX (BMI) DOCUMENTED: ICD-10-PCS | Mod: CPTII,S$GLB,, | Performed by: FAMILY MEDICINE

## 2019-10-03 PROCEDURE — 99214 PR OFFICE/OUTPT VISIT, EST, LEVL IV, 30-39 MIN: ICD-10-PCS | Mod: S$GLB,,, | Performed by: FAMILY MEDICINE

## 2019-10-03 PROCEDURE — 99999 PR PBB SHADOW E&M-EST. PATIENT-LVL III: CPT | Mod: PBBFAC,,, | Performed by: FAMILY MEDICINE

## 2019-10-03 PROCEDURE — 99999 PR PBB SHADOW E&M-EST. PATIENT-LVL III: ICD-10-PCS | Mod: PBBFAC,,, | Performed by: FAMILY MEDICINE

## 2019-10-03 RX ORDER — ONDANSETRON 4 MG/1
8 TABLET, ORALLY DISINTEGRATING ORAL EVERY 8 HOURS PRN
Qty: 15 TABLET | Refills: 1 | Status: SHIPPED | OUTPATIENT
Start: 2019-10-03 | End: 2020-06-25

## 2019-10-03 NOTE — PROGRESS NOTES
History of Present Illness (HPI):  David is a [facility]  c/o lethargy, cough, nausea. He reports that the symptoms started Monday during the day with a stomach ache but was able to get through practice. He reports Monday night he only slept about 5 hours due to constant waking up (night sweats); this has continued Tuesday and Wednesday evening. Reports vomiting before practice and in the evening since Monday. Patient reports the worst symptom is weakness/lethargy.     History of Present Illness (HPI):   #1  Main symptom: fatigue / nausea / emesis ~q12h  [Onset] duration: this is day 4  [Palliative] modifying factors: rest, OTC cold meds  [Provocative] modifying factors:   Prior: none  Progression: slowly improving  Quality:   [Radiation] associated signs and symptoms: - / +  -fever, +chills, -night sweats  +fatigue  -headache  -visual changes, -red eyes, -eye discomfort  -sinus congestion, -rhinorrhea, -epistaxis  +cough, -productive cough, -hemoptysis  +nausea, -+emesis, -hematemesis  +diarrhea, -melena, -hematochezia  Severity: per nursing documentation  [Sick contacts]: at least one teammate with similar symptoms  Timing:   [Trauma] context:    AAFP/FPM: Pocket Guide Documentation Guidelines, (c)2014    (42848=0+, 91893=2+)    Physical Examination (PE):   Vital signs reviewed   Afebrile, normotensive, normocardic (relative tachycardia)  Constitution: No apparent distress  HEENT:    Head: atraumatic, normocephalic   Eyes: no conjunctivitis    Ears: EAC clear, TM atraumatic, no hemotympanum, no middle ear discharge   Nose: no nasal congestion noted   Throat: erythematous, without tonsillar edema or tonsillar exudate  Neck:    Soft   Non tender   Lymphadenopathy  Pulmonary:   LFCTAB  Cardiac:   Regular rate and rhythm   No murmurs  Abdomen:   Soft   Diffuse TTP  Genitourinary :   Not performed    Assessment:   #1 suspected viral gastritis    Imaging studies reviewed:   N/a    Plan:  Basketball  participation as tolerated  Relative rest  Aggressive oral hydration  Ondansetron for nausea  Acetaminophen for fever / chills    Follow up per pt  Should symptoms worsen or fail to resolve, consider:  Revisiting the above options

## 2019-10-30 ENCOUNTER — TELEPHONE (OUTPATIENT)
Dept: SPORTS MEDICINE | Facility: CLINIC | Age: 19
End: 2019-10-30

## 2019-10-30 DIAGNOSIS — Z23 DIPHTHERIA-TETANUS-PERTUSSIS (DTP) VACCINATION: Primary | ICD-10-CM

## 2019-10-30 DIAGNOSIS — S91.319A LACERATION OF FOOT, UNSPECIFIED LATERALITY, INITIAL ENCOUNTER: ICD-10-CM

## 2019-11-17 ENCOUNTER — DOCUMENTATION ONLY (OUTPATIENT)
Dept: SPORTS MEDICINE | Facility: CLINIC | Age: 19
End: 2019-11-17

## 2019-11-17 ENCOUNTER — HOSPITAL ENCOUNTER (OUTPATIENT)
Dept: RADIOLOGY | Facility: HOSPITAL | Age: 19
Discharge: HOME | End: 2019-11-17
Attending: ORTHOPAEDIC SURGERY
Payer: COMMERCIAL

## 2019-11-17 DIAGNOSIS — M54.12 CERVICAL RADICULOPATHY: ICD-10-CM

## 2019-11-17 DIAGNOSIS — M54.12 CERVICAL RADICULOPATHY: Primary | ICD-10-CM

## 2019-11-17 PROCEDURE — 72141 MRI NECK SPINE W/O DYE: CPT | Mod: TC

## 2019-11-17 PROCEDURE — 72141 MRI CERVICAL SPINE WITHOUT CONTRAST: ICD-10-PCS | Mod: 26,,, | Performed by: RADIOLOGY

## 2019-11-17 PROCEDURE — 72141 MRI NECK SPINE W/O DYE: CPT | Mod: 26,,, | Performed by: RADIOLOGY

## 2019-11-17 NOTE — PROGRESS NOTES
TONY S/p stinger    19 y.o. year-old [employer] . S/p cervical stinger.  Reports left upper extremity numbness when waking up this morning.  However reports that sensation has returned for the most part.    s/p revision Foster fracture with Dr. Chan on 9/1/17.  then revision of scar and BMAC injection to cuboid with Dr. Chan 3/13/18      PHYSICAL EXAM:      GEN: Alert and oriented x3. In no apparent distress.     Well-developed, well-nourished male. Observation of ears, eyes, and nose   reveal no gross abnormalities.  See ophthal report for full eye exam    CERVICAL SPINE: Full range of motion.    Motor 5/5 C5, C6, C7  4+/5 C8, T1    Sensation   2/2 C5, C7-T1  1/2 C6    BILATERAL SHOULDER EXAM: shows full symmetric range of motion with 5/5, except fo 4+/5 ER with arm at side.   strength throughout the supraspinatus, deltoid, and subscapularis. 4+/5 infraspinatus.  No evidence of instability.     BILATERAL ELBOW EXAM: Shows full and symmetric extension/flexion, and pronation/supination, and varus/valgus stability. Negative posterolateral   rotatory instability.     BILATERAL WRIST EXAM: Shows normal and symmetric full flexion/extension   and radial/ulnar deviation. Except for 4+/5 Left wrist extension    HAND EXAM: Shows full range of motion to bilateral hands and full strength and stability to all fingers. Except for 1/2 sensation in C6, and 4+/5 Intrinsics.    LUMBAR SPINE EXAM: Shows no evidence of any scoliosis. He has full flexion and extension with no pain and no apparent tenderness to the spine. Negative straight leg raise bilaterally.     HIP EXAM: Shows full range of motion without pain or signs of impingement.   Symmetric internal rotation without pain.  Has 5/5 hip flexion strength and 5/5 strength testing to the entire lower extremity.   RLE  ROM: (* = pain)    Flexion:    120 degrees  External rotation: 45 degrees  Internal rotation with axial load: 30 degrees  Internal rotation  without axial load: 40 degrees  Abduction:  45 degrees  Adduction:   20 degrees    LLE  Symmetric       KNEE EXAM: Examination of bilateral knees shows symmetric range of motion   0 to 145 degrees with negative Lachman and stable to varus-valgus stress   testing. No joint line tenderness. Good quad tone. No effusion of either knee.     Bilateral legs: NTTP over tibiae mid shaft or distal.      FOOT AND ANKLE EXAM: Shows good range of motion to bilateral ankles with   no neurologic deficit. There is a 5/5 strength exam throughout the foot   and ankle exam. There is a normal arch on both feet. There is no tenderness to palpation along either foot. - turf toe exam      ASSESSMENT:   Left Stinger - improving  PT  NSAIDs  Out for today's game

## 2020-02-20 ENCOUNTER — TELEPHONE (OUTPATIENT)
Dept: SPORTS MEDICINE | Facility: CLINIC | Age: 20
End: 2020-02-20

## 2020-02-20 RX ORDER — PROMETHAZINE HYDROCHLORIDE AND CODEINE PHOSPHATE 6.25; 1 MG/5ML; MG/5ML
5 SOLUTION ORAL NIGHTLY PRN
Qty: 50 ML | Refills: 0 | Status: SHIPPED | OUTPATIENT
Start: 2020-02-20 | End: 2020-03-01

## 2020-03-05 ENCOUNTER — TELEPHONE (OUTPATIENT)
Dept: SPORTS MEDICINE | Facility: CLINIC | Age: 20
End: 2020-03-05

## 2020-03-05 RX ORDER — PROMETHAZINE HYDROCHLORIDE 6.25 MG/5ML
25 SYRUP ORAL NIGHTLY PRN
Qty: 50 ML | Refills: 3 | Status: SHIPPED | OUTPATIENT
Start: 2020-03-05 | End: 2020-06-25

## 2020-05-08 ENCOUNTER — TELEPHONE (OUTPATIENT)
Dept: SPORTS MEDICINE | Facility: CLINIC | Age: 20
End: 2020-05-08

## 2020-05-08 DIAGNOSIS — Z01.84 ANTIBODY RESPONSE EXAMINATION: Primary | ICD-10-CM

## 2020-06-24 ENCOUNTER — OFFICE VISIT (OUTPATIENT)
Dept: INFECTIOUS DISEASES | Facility: CLINIC | Age: 20
End: 2020-06-24
Payer: COMMERCIAL

## 2020-06-24 DIAGNOSIS — Z00.00 PHYSICAL EXAM: Primary | ICD-10-CM

## 2020-06-24 DIAGNOSIS — Z71.89 ADVICE GIVEN ABOUT COVID-19 VIRUS BY TELEPHONE: Primary | ICD-10-CM

## 2020-06-24 DIAGNOSIS — U07.1 COVID-19: ICD-10-CM

## 2020-06-24 PROCEDURE — 99203 OFFICE O/P NEW LOW 30 MIN: CPT | Mod: 95,,, | Performed by: INTERNAL MEDICINE

## 2020-06-24 PROCEDURE — 1160F PR REVIEW ALL MEDS BY PRESCRIBER/CLIN PHARMACIST DOCUMENTED: ICD-10-PCS | Mod: CPTII,95,, | Performed by: INTERNAL MEDICINE

## 2020-06-24 PROCEDURE — 1160F RVW MEDS BY RX/DR IN RCRD: CPT | Mod: CPTII,95,, | Performed by: INTERNAL MEDICINE

## 2020-06-24 PROCEDURE — 99203 PR OFFICE/OUTPT VISIT, NEW, LEVL III, 30-44 MIN: ICD-10-PCS | Mod: 95,,, | Performed by: INTERNAL MEDICINE

## 2020-06-24 PROCEDURE — 1159F MED LIST DOCD IN RCRD: CPT | Mod: CPTII,95,, | Performed by: INTERNAL MEDICINE

## 2020-06-24 PROCEDURE — 1159F PR MEDICATION LIST DOCUMENTED IN MEDICAL RECORD: ICD-10-PCS | Mod: CPTII,95,, | Performed by: INTERNAL MEDICINE

## 2020-06-24 NOTE — PROGRESS NOTES
Established Patient - Audio Only Telehealth Visit     The patient location is: audio  The chief complaint leading to consultation is: positive SARs CoV2 Ab test.  Visit type: Virtual visit with audio only (telephone)  Total time spent with patient: 10 minutes       The reason for the audio only service rather than synchronous audio and video virtual visit was related to technical difficulties or patient preference/necessity.     Each patient to whom I provide medical services by telemedicine is:  (1) informed of the relationship between the physician and patient and the respective role of any other health care provider with respect to management of the patient; and (2) notified that they may decline to receive medical services by telemedicine and may withdraw from such care at any time. Patient verbally consented to receive this service via voice-only telephone call.            HPI: Patient's Covid AB IgG positive. PCR was negative per primary MD.  Patient with no symptoms- no fever, cough, shortness of breath. No loss of smell or taste. Has not had any recent symptoms in past few months.  One of his friends had symptoms Saturday and tested positive today.     Assessment and plan:  Counseled on difference between antibody test and swab PCR test. Also counseled that despite AB being positive would continue to observe good practices to avoid exposure- including but not limited to hand washing, distancing, wearing mask, etc. as we do not know if and how long immunity may last.  Patient reports that he will continue to undergo testing per ADELSO guidelines.   Discussed that he is not currently thought to be contagious to others.  Referred to CDC.gov website for additional information. Also free to call Dr. Vargas or me for additional questions or concerns.   He has no additional questions today.                   This service was not originating from a related E/M service provided within the previous 7 days nor will it  lead to an E/M service or procedure within the next 24 hours or my soonest available appointment.  Prevailing standard of care was able to be met in this audio-only visit.

## 2020-06-25 ENCOUNTER — CLINICAL SUPPORT (OUTPATIENT)
Dept: CARDIOLOGY | Facility: CLINIC | Age: 20
End: 2020-06-25
Attending: INTERNAL MEDICINE
Payer: COMMERCIAL

## 2020-06-25 ENCOUNTER — OFFICE VISIT (OUTPATIENT)
Dept: CARDIOLOGY | Facility: CLINIC | Age: 20
End: 2020-06-25
Payer: COMMERCIAL

## 2020-06-25 ENCOUNTER — LAB VISIT (OUTPATIENT)
Dept: LAB | Facility: HOSPITAL | Age: 20
End: 2020-06-25
Attending: INTERNAL MEDICINE
Payer: COMMERCIAL

## 2020-06-25 VITALS
DIASTOLIC BLOOD PRESSURE: 80 MMHG | SYSTOLIC BLOOD PRESSURE: 120 MMHG | BODY MASS INDEX: 25.57 KG/M2 | HEIGHT: 76 IN | HEART RATE: 42 BPM | WEIGHT: 210 LBS

## 2020-06-25 VITALS
SYSTOLIC BLOOD PRESSURE: 120 MMHG | WEIGHT: 204.25 LBS | HEIGHT: 76 IN | HEART RATE: 48 BPM | BODY MASS INDEX: 24.87 KG/M2 | DIASTOLIC BLOOD PRESSURE: 80 MMHG

## 2020-06-25 DIAGNOSIS — U07.1 COVID-19 VIRUS INFECTION: ICD-10-CM

## 2020-06-25 DIAGNOSIS — R69 DIAGNOSIS DEFERRED: ICD-10-CM

## 2020-06-25 DIAGNOSIS — Z00.00 PHYSICAL EXAM: ICD-10-CM

## 2020-06-25 LAB
ALBUMIN SERPL BCP-MCNC: 4.4 G/DL (ref 3.5–5.2)
ALP SERPL-CCNC: 88 U/L (ref 55–135)
ALT SERPL W/O P-5'-P-CCNC: 16 U/L (ref 10–44)
ANION GAP SERPL CALC-SCNC: 9 MMOL/L (ref 8–16)
ASCENDING AORTA: 2.24 CM
AST SERPL-CCNC: 22 U/L (ref 10–40)
AV INDEX (PROSTH): 0.66
AV MEAN GRADIENT: 2 MMHG
AV PEAK GRADIENT: 5 MMHG
AV VALVE AREA: 2.4 CM2
AV VELOCITY RATIO: 0.73
BASOPHILS # BLD AUTO: 0.06 K/UL (ref 0–0.2)
BASOPHILS NFR BLD: 0.9 % (ref 0–1.9)
BILIRUB SERPL-MCNC: 0.4 MG/DL (ref 0.1–1)
BSA FOR ECHO PROCEDURE: 2.26 M2
BUN SERPL-MCNC: 9 MG/DL (ref 6–20)
CALCIUM SERPL-MCNC: 9.6 MG/DL (ref 8.7–10.5)
CHLORIDE SERPL-SCNC: 107 MMOL/L (ref 95–110)
CO2 SERPL-SCNC: 24 MMOL/L (ref 23–29)
CREAT SERPL-MCNC: 1 MG/DL (ref 0.5–1.4)
CV ECHO LV RWT: 0.37 CM
DIFFERENTIAL METHOD: ABNORMAL
DOP CALC AO PEAK VEL: 1.11 M/S
DOP CALC AO VTI: 25.76 CM
DOP CALC LVOT AREA: 3.7 CM2
DOP CALC LVOT DIAMETER: 2.16 CM
DOP CALC LVOT PEAK VEL: 0.81 M/S
DOP CALC LVOT STROKE VOLUME: 61.93 CM3
DOP CALC RVOT PEAK VEL: 0.73 M/S
DOP CALC RVOT VTI: 18.68 CM
DOP CALCLVOT PEAK VEL VTI: 16.91 CM
E WAVE DECELERATION TIME: 196.71 MSEC
E/A RATIO: 1.43
E/E' RATIO: 5.84 M/S
ECHO LV POSTERIOR WALL: 0.89 CM (ref 0.6–1.1)
EOSINOPHIL # BLD AUTO: 0.1 K/UL (ref 0–0.5)
EOSINOPHIL NFR BLD: 0.8 % (ref 0–8)
ERYTHROCYTE [DISTWIDTH] IN BLOOD BY AUTOMATED COUNT: 13.2 % (ref 11.5–14.5)
EST. GFR  (AFRICAN AMERICAN): >60 ML/MIN/1.73 M^2
EST. GFR  (NON AFRICAN AMERICAN): >60 ML/MIN/1.73 M^2
FRACTIONAL SHORTENING: 34 % (ref 28–44)
GLUCOSE SERPL-MCNC: 79 MG/DL (ref 70–110)
HCT VFR BLD AUTO: 47.7 % (ref 40–54)
HGB BLD-MCNC: 14.9 G/DL (ref 14–18)
IMM GRANULOCYTES # BLD AUTO: 0.01 K/UL (ref 0–0.04)
IMM GRANULOCYTES NFR BLD AUTO: 0.2 % (ref 0–0.5)
INTERVENTRICULAR SEPTUM: 0.87 CM (ref 0.6–1.1)
IVRT: 76.12 MSEC
LA MAJOR: 5.05 CM
LA MINOR: 5.12 CM
LA WIDTH: 3.64 CM
LEFT ATRIUM SIZE: 3.45 CM
LEFT ATRIUM VOLUME INDEX: 24 ML/M2
LEFT ATRIUM VOLUME: 54.28 CM3
LEFT INTERNAL DIMENSION IN SYSTOLE: 3.19 CM (ref 2.1–4)
LEFT VENTRICLE DIASTOLIC VOLUME INDEX: 48.97 ML/M2
LEFT VENTRICLE DIASTOLIC VOLUME: 110.77 ML
LEFT VENTRICLE MASS INDEX: 65 G/M2
LEFT VENTRICLE SYSTOLIC VOLUME INDEX: 17.9 ML/M2
LEFT VENTRICLE SYSTOLIC VOLUME: 40.55 ML
LEFT VENTRICULAR INTERNAL DIMENSION IN DIASTOLE: 4.86 CM (ref 3.5–6)
LEFT VENTRICULAR MASS: 146.47 G
LV LATERAL E/E' RATIO: 6.64 M/S
LV SEPTAL E/E' RATIO: 5.21 M/S
LYMPHOCYTES # BLD AUTO: 2.2 K/UL (ref 1–4.8)
LYMPHOCYTES NFR BLD: 34 % (ref 18–48)
MCH RBC QN AUTO: 28.1 PG (ref 27–31)
MCHC RBC AUTO-ENTMCNC: 31.2 G/DL (ref 32–36)
MCV RBC AUTO: 90 FL (ref 82–98)
MONOCYTES # BLD AUTO: 0.5 K/UL (ref 0.3–1)
MONOCYTES NFR BLD: 8 % (ref 4–15)
MV PEAK A VEL: 0.51 M/S
MV PEAK E VEL: 0.73 M/S
MV STENOSIS PRESSURE HALF TIME: 57.05 MS
MV VALVE AREA P 1/2 METHOD: 3.86 CM2
NEUTROPHILS # BLD AUTO: 3.6 K/UL (ref 1.8–7.7)
NEUTROPHILS NFR BLD: 56.1 % (ref 38–73)
NRBC BLD-RTO: 0 /100 WBC
PISA MRMAX VEL: 0.05 M/S
PISA RADIUS: 0.34 CM
PISA TR MAX VEL: 2.12 M/S
PISA TR VN NYQUIST: 0 M/S
PLATELET # BLD AUTO: 240 K/UL (ref 150–350)
PMV BLD AUTO: 10.4 FL (ref 9.2–12.9)
POTASSIUM SERPL-SCNC: 4.4 MMOL/L (ref 3.5–5.1)
PROT SERPL-MCNC: 7.4 G/DL (ref 6–8.4)
PULM VEIN S/D RATIO: 0.63
PV MEAN GRADIENT: 1.29 MMHG
PV PEAK D VEL: 0.49 M/S
PV PEAK S VEL: 0.31 M/S
PV PEAK VELOCITY: 0.89 CM/S
RA MAJOR: 5.54 CM
RA PRESSURE: 8 MMHG
RA WIDTH: 4.65 CM
RBC # BLD AUTO: 5.3 M/UL (ref 4.6–6.2)
RETIRED EF AND QEF - SEE NOTES: 60 %
RIGHT VENTRICULAR END-DIASTOLIC DIMENSION: 3.69 CM
SINUS: 2.86 CM
SODIUM SERPL-SCNC: 140 MMOL/L (ref 136–145)
STJ: 2.28 CM
TDI LATERAL: 0.11 M/S
TDI SEPTAL: 0.14 M/S
TDI: 0.13 M/S
TR MAX PG: 18 MMHG
TRICUSPID ANNULAR PLANE SYSTOLIC EXCURSION: 2.37 CM
TROPONIN I SERPL DL<=0.01 NG/ML-MCNC: <0.006 NG/ML (ref 0–0.03)
TV REST PULMONARY ARTERY PRESSURE: 26 MMHG
WBC # BLD AUTO: 6.36 K/UL (ref 3.9–12.7)

## 2020-06-25 PROCEDURE — 93306 ECHO (CUPID ONLY): ICD-10-PCS | Mod: S$GLB,,, | Performed by: INTERNAL MEDICINE

## 2020-06-25 PROCEDURE — 99999 PR PBB SHADOW E&M-EST. PATIENT-LVL II: ICD-10-PCS | Mod: PBBFAC,,,

## 2020-06-25 PROCEDURE — 36415 COLL VENOUS BLD VENIPUNCTURE: CPT | Mod: PO

## 2020-06-25 PROCEDURE — 93000 ELECTROCARDIOGRAM COMPLETE: CPT | Mod: S$GLB,,, | Performed by: INTERNAL MEDICINE

## 2020-06-25 PROCEDURE — 80053 COMPREHEN METABOLIC PANEL: CPT

## 2020-06-25 PROCEDURE — 93306 TTE W/DOPPLER COMPLETE: CPT | Mod: S$GLB,,, | Performed by: INTERNAL MEDICINE

## 2020-06-25 PROCEDURE — 3008F BODY MASS INDEX DOCD: CPT | Mod: CPTII,S$GLB,, | Performed by: INTERNAL MEDICINE

## 2020-06-25 PROCEDURE — 85025 COMPLETE CBC W/AUTO DIFF WBC: CPT

## 2020-06-25 PROCEDURE — 93306 TTE W/DOPPLER COMPLETE: CPT

## 2020-06-25 PROCEDURE — 99204 OFFICE O/P NEW MOD 45 MIN: CPT | Mod: 25,S$GLB,, | Performed by: INTERNAL MEDICINE

## 2020-06-25 PROCEDURE — 99204 PR OFFICE/OUTPT VISIT, NEW, LEVL IV, 45-59 MIN: ICD-10-PCS | Mod: 25,S$GLB,, | Performed by: INTERNAL MEDICINE

## 2020-06-25 PROCEDURE — 93356 PR IMG, MYOCARDIAL STRAIN, SPECKLE TRACKING: ICD-10-PCS | Mod: S$GLB,,, | Performed by: INTERNAL MEDICINE

## 2020-06-25 PROCEDURE — 3008F PR BODY MASS INDEX (BMI) DOCUMENTED: ICD-10-PCS | Mod: CPTII,S$GLB,, | Performed by: INTERNAL MEDICINE

## 2020-06-25 PROCEDURE — 99999 PR PBB SHADOW E&M-EST. PATIENT-LVL III: ICD-10-PCS | Mod: PBBFAC,,, | Performed by: INTERNAL MEDICINE

## 2020-06-25 PROCEDURE — 93356 MYOCRD STRAIN IMG SPCKL TRCK: CPT

## 2020-06-25 PROCEDURE — 84484 ASSAY OF TROPONIN QUANT: CPT

## 2020-06-25 PROCEDURE — 93000 EKG 12-LEAD: ICD-10-PCS | Mod: S$GLB,,, | Performed by: INTERNAL MEDICINE

## 2020-06-25 PROCEDURE — 93356 MYOCRD STRAIN IMG SPCKL TRCK: CPT | Mod: S$GLB,,, | Performed by: INTERNAL MEDICINE

## 2020-06-25 PROCEDURE — 99999 PR PBB SHADOW E&M-EST. PATIENT-LVL II: CPT | Mod: PBBFAC,,,

## 2020-06-25 PROCEDURE — 99999 PR PBB SHADOW E&M-EST. PATIENT-LVL III: CPT | Mod: PBBFAC,,, | Performed by: INTERNAL MEDICINE

## 2020-06-25 NOTE — PROGRESS NOTES
Subjective:   Patient ID:  David Harmon is a 20 y.o. male who presents for evaluation of COVID Recovery [facility]    HPI: He is [employer] start and had COVID The patient has no chest pain, SOB, TIA, palpitations, syncope or pre-syncope.Patient currently exercises many times per week.        Review of Systems   Constitution: Negative for chills, decreased appetite, diaphoresis, fever, malaise/fatigue, night sweats, weight gain and weight loss.   HENT: Negative for congestion, hoarse voice, nosebleeds, sore throat and tinnitus.    Eyes: Negative for blurred vision, double vision, vision loss in left eye, vision loss in right eye, visual disturbance and visual halos.   Cardiovascular: Negative for chest pain, claudication, cyanosis, dyspnea on exertion, irregular heartbeat, leg swelling, near-syncope, orthopnea, palpitations, paroxysmal nocturnal dyspnea and syncope.   Respiratory: Negative for cough, hemoptysis, shortness of breath, sleep disturbances due to breathing, snoring, sputum production and wheezing.    Endocrine: Negative for cold intolerance, heat intolerance, polydipsia, polyphagia and polyuria.   Hematologic/Lymphatic: Negative for adenopathy and bleeding problem. Does not bruise/bleed easily.   Skin: Negative for color change, dry skin, flushing, itching, nail changes, poor wound healing, rash, skin cancer, suspicious lesions and unusual hair distribution.   Musculoskeletal: Negative for arthritis, back pain, falls, gout, joint pain, joint swelling, muscle cramps, muscle weakness, myalgias and stiffness.   Gastrointestinal: Negative for abdominal pain, anorexia, change in bowel habit, constipation, diarrhea, dysphagia, heartburn, hematemesis, hematochezia, melena and vomiting.   Genitourinary: Negative for decreased libido, dysuria, hematuria, hesitancy and urgency.   Neurological: Negative for excessive daytime sleepiness, dizziness, focal weakness, headaches, light-headedness, loss of balance,  "numbness, paresthesias, seizures, sensory change, tremors, vertigo and weakness.   Psychiatric/Behavioral: Negative for altered mental status, depression, hallucinations, memory loss, substance abuse and suicidal ideas. The patient does not have insomnia and is not nervous/anxious.    Allergic/Immunologic: Negative for environmental allergies and hives.       Objective: /80   Pulse (!) 48   Ht 6' 4" (1.93 m)   Wt 92.7 kg (204 lb 4.1 oz)   BMI 24.86 kg/m²      Physical Exam   Constitutional: He is oriented to person, place, and time. He appears well-developed and well-nourished. No distress.   HENT:   Head: Normocephalic.   Eyes: Pupils are equal, round, and reactive to light. EOM are normal.   Neck: Normal range of motion. No thyromegaly present.   Cardiovascular: Normal rate, regular rhythm, normal heart sounds and intact distal pulses. Exam reveals no gallop and no friction rub.   No murmur heard.  Pulses:       Carotid pulses are 3+ on the right side and 3+ on the left side.       Radial pulses are 3+ on the right side and 3+ on the left side.        Femoral pulses are 3+ on the right side and 3+ on the left side.       Popliteal pulses are 3+ on the right side and 3+ on the left side.        Dorsalis pedis pulses are 3+ on the right side and 3+ on the left side.        Posterior tibial pulses are 3+ on the right side and 3+ on the left side.   Pulmonary/Chest: Effort normal and breath sounds normal. No respiratory distress. He has no wheezes. He has no rales. He exhibits no tenderness.   Abdominal: Soft. He exhibits no distension and no mass. There is no abdominal tenderness.   Musculoskeletal: Normal range of motion.   Lymphadenopathy:     He has no cervical adenopathy.   Neurological: He is alert and oriented to person, place, and time.   Skin: Skin is warm. He is not diaphoretic. No cyanosis. Nails show no clubbing.   Psychiatric: He has a normal mood and affect. His speech is normal and behavior is " normal. Judgment and thought content normal. Cognition and memory are normal.       Assessment:     1. COVID-19 virus infection    2. Diagnosis deferred        Plan:   Discussed diet , achieving and maintaining ideal body weight, and exercise.   We reviewed meds in detail  Reassured-Discussed goals, options, plan  Troponin, ECG and CFD as per [facility]    David was seen today for establish care.    Diagnoses and all orders for this visit:    COVID-19 virus infection  -     IN OFFICE EKG 12-LEAD (to Muse); Future; Expected date: 06/26/2020  -     Comprehensive metabolic panel; Future; Expected date: 06/25/2020  -     CBC auto differential; Future; Expected date: 06/25/2020    Diagnosis deferred  -     IN OFFICE EKG 12-LEAD (to Muse); Future; Expected date: 06/26/2020            Follow up for Echo ,Troponin, CBC, Comp now.     Addendum-If Troponin and Echo OK, he is cleared for full exercise.

## 2020-06-25 NOTE — PATIENT INSTRUCTIONS
Discussed diet , achieving and maintaining ideal body weight, and exercise.   We reviewed meds in detail  Reassured-Discussed goals, options, plan  Troponin, ECG and CFD as per [facility]

## 2020-06-25 NOTE — LETTER
June 25, 2020      Everett Vargas MD  [address]  [address]  [facility] [address]           [place] [facility]  [address] [facility].  [facility] [place] [address]  Phone: [phone]          Patient: David Harmon   MR Number: [id number]   YOB: 2000   Date of Visit: 6/25/2020       Dear Dr. Everett Vargas:    Thank you for referring David Harmon to me for evaluation. Attached you will find relevant portions of my assessment and plan of care.    If you have questions, please do not hesitate to call me. I look forward to following David Harmon along with you.    Sincerely,    Jerel Redd MD    Enclosure  CC:  No Recipients    If you would like to receive this communication electronically, please contact [email or web address] or [phone] to request more information on [employer] access.    For providers and/or their staff who would like to refer a patient to [facility], please contact us through our one-stop-shop provider referral line, [facility], at [phone].    If you feel you have received this communication in error or would no longer like to receive these types of communications, please e-mail [email or web address]

## 2020-06-26 ENCOUNTER — TELEPHONE (OUTPATIENT)
Dept: CARDIOLOGY | Facility: CLINIC | Age: 20
End: 2020-06-26

## 2020-06-26 NOTE — TELEPHONE ENCOUNTER
----- Message from Jerel Redd MD sent at 6/26/2020  9:08 AM CDT -----  Release-tell him echo and labs all good

## 2020-06-26 NOTE — TELEPHONE ENCOUNTER
Spoke with pt mother, she gave pt number. Called [employer] [place] to get the agent number to reach the patient.     ----- Message from Jerel Redd MD sent at 6/26/2020  9:08 AM CDT -----  Release-tell him echo and labs all good

## 2020-08-10 ENCOUNTER — OFFICE VISIT (OUTPATIENT)
Dept: SPORTS MEDICINE | Facility: CLINIC | Age: 20
End: 2020-08-10

## 2020-08-10 DIAGNOSIS — Z02.5 SPORTS PHYSICAL: Primary | ICD-10-CM

## 2020-08-10 PROCEDURE — 99999 PR PBB SHADOW E&M-EST. PATIENT-LVL I: CPT | Mod: PBBFAC,,, | Performed by: ORTHOPAEDIC SURGERY

## 2020-08-10 PROCEDURE — 99999 PR PBB SHADOW E&M-EST. PATIENT-LVL I: ICD-10-PCS | Mod: PBBFAC,,, | Performed by: ORTHOPAEDIC SURGERY

## 2020-08-10 PROCEDURE — 99499 UNLISTED E&M SERVICE: CPT | Mod: S$GLB,,, | Performed by: ORTHOPAEDIC SURGERY

## 2020-08-10 PROCEDURE — 99499 NO LOS: ICD-10-PCS | Mod: S$GLB,,, | Performed by: ORTHOPAEDIC SURGERY

## 2020-08-13 NOTE — PROGRESS NOTES
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  Interview was done in [place].       ORTHOPAEDIC HISTORY:     1. Cervical stinger: Nov 2019. Missed one game. No recurrence.  No missed time once he returned to participation.  No issues once restart games began in [place]. No current symptoms    2. History of quad contusion last summer league and ankle sprain as well. Caused him to miss 1 month. No recent issues with either.      3. Right foot: s/p revision Foster fracture with Dr. Chan on 9/1/17.  Had revision of scar and BMAC injection to cuboid with Dr. Chan 3/13/18    PHYSICAL EXAM:     GEN: Alert and oriented x3. In no apparent distress.   Well-developed, well-nourished male. Observation of ears, eyes, and nose reveal no gross abnormalities.     CERVICAL SPINE: Full range of motion with no deficits.     BILATERAL SHOULDER EXAM: shows full symmetric range of motion with 5/5   strength throughout the supraspinatus and infraspinatus, deltoid, and   subscapularis. No evidence of instability.     BILATERAL ELBOW EXAM: Shows full and symmetric extension/flexion, and pronation/supination, and varus/valgus stability. Negative posterolateral rotatory instability.     BILATERAL WRIST EXAM: Shows normal and symmetric full flexion/extension and radial/ulnar deviation.     HAND EXAM: Shows full range of motion to bilateral hands and full strength and stability to all fingers.     LUMBAR SPINE EXAM: Shows no evidence of any scoliosis. He has full flexion and extension with no pain and no apparent tenderness to the spine. Negative straight leg raise bilaterally.     HIP EXAM: Shows full range of motion without pain or signs of impingement. Symmetric internal rotation without pain.  Has 5/5 hip flexion strength and 5/5 strength testing to the entire lower extremity.     KNEE EXAM: Examination  of bilateral knees shows symmetric range of motion 0 to 145 degrees with negative Lachman and stable to varus-valgus stress testing. No joint line tenderness. Good quad tone. No effusion of either knee.     FOOT AND ANKLE EXAM: Shows good range of motion to bilateral ankles with no neurologic deficit. There is a 5/5 strength exam throughout the foot and ankle exam. There is a normal arch on both feet. There is no tenderness to palpation along either feet.     IMAGING:     None    INACTIVE PROBLEMS:     1. Cervical stinger: Nov 2019. Missed one game. No recurrence.  No missed time once he returned to participation.  No issues once restart games began in [place]. No current symptoms    2. History of quad contusion last summer league and ankle sprain as well. Caused him to miss 1 month. No recent issues with either.      3. Right foot: s/p revision Foster fracture with Dr. Chan on 9/1/17.  Had revision of scar and BMAC injection to cuboid with Dr. Chan 3/13/18    ACTIVE PROBLEMS:     None     ASSESSMENT:     David enters the 2020 off season without any active orthopaedic issues.

## 2021-02-23 DIAGNOSIS — Z20.822 ENCOUNTER FOR LABORATORY TESTING FOR COVID-19 VIRUS: ICD-10-CM
